# Patient Record
Sex: FEMALE | Race: WHITE | URBAN - METROPOLITAN AREA
[De-identification: names, ages, dates, MRNs, and addresses within clinical notes are randomized per-mention and may not be internally consistent; named-entity substitution may affect disease eponyms.]

---

## 2023-02-21 ENCOUNTER — EMERGENCY (EMERGENCY)
Facility: HOSPITAL | Age: 83
LOS: 0 days | Discharge: HOME | End: 2023-02-21
Attending: STUDENT IN AN ORGANIZED HEALTH CARE EDUCATION/TRAINING PROGRAM
Payer: MEDICARE

## 2023-02-21 VITALS
DIASTOLIC BLOOD PRESSURE: 74 MMHG | HEART RATE: 94 BPM | OXYGEN SATURATION: 92 % | TEMPERATURE: 98 F | SYSTOLIC BLOOD PRESSURE: 160 MMHG | RESPIRATION RATE: 18 BRPM

## 2023-02-21 VITALS — WEIGHT: 104.94 LBS

## 2023-02-21 DIAGNOSIS — H26.9 UNSPECIFIED CATARACT: ICD-10-CM

## 2023-02-21 DIAGNOSIS — S52.612A DISPLACED FRACTURE OF LEFT ULNA STYLOID PROCESS, INITIAL ENCOUNTER FOR CLOSED FRACTURE: ICD-10-CM

## 2023-02-21 DIAGNOSIS — S52.502A UNSPECIFIED FRACTURE OF THE LOWER END OF LEFT RADIUS, INITIAL ENCOUNTER FOR CLOSED FRACTURE: ICD-10-CM
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
## 2023-02-21 DIAGNOSIS — M25.532 PAIN IN LEFT WRIST: ICD-10-CM

## 2023-02-21 DIAGNOSIS — S00.31XA ABRASION OF NOSE, INITIAL ENCOUNTER: ICD-10-CM
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
## 2023-02-21 PROCEDURE — 76376 3D RENDER W/INTRP POSTPROCES: CPT | Mod: 26

## 2023-02-21 PROCEDURE — 73100 X-RAY EXAM OF WRIST: CPT | Mod: 26,59,LT,76

## 2023-02-21 PROCEDURE — 25605 CLTX DST RDL FX/EPHYS SEP W/: CPT | Mod: LT

## 2023-02-21 PROCEDURE — 70450 CT HEAD/BRAIN W/O DYE: CPT | Mod: MA

## 2023-02-21 PROCEDURE — 73130 X-RAY EXAM OF HAND: CPT | Mod: LT

## 2023-02-21 PROCEDURE — 99285 EMERGENCY DEPT VISIT HI MDM: CPT | Mod: 25

## 2023-02-21 PROCEDURE — 70450 CT HEAD/BRAIN W/O DYE: CPT | Mod: 26,MA

## 2023-02-21 PROCEDURE — 73090 X-RAY EXAM OF FOREARM: CPT | Mod: LT

## 2023-02-21 PROCEDURE — 72125 CT NECK SPINE W/O DYE: CPT | Mod: MA

## 2023-02-21 PROCEDURE — 73130 X-RAY EXAM OF HAND: CPT | Mod: 26,LT

## 2023-02-21 PROCEDURE — 72125 CT NECK SPINE W/O DYE: CPT | Mod: 26,MA

## 2023-02-21 PROCEDURE — 70486 CT MAXILLOFACIAL W/O DYE: CPT | Mod: MA

## 2023-02-21 PROCEDURE — 73090 X-RAY EXAM OF FOREARM: CPT | Mod: 26,LT

## 2023-02-21 PROCEDURE — 76376 3D RENDER W/INTRP POSTPROCES: CPT

## 2023-02-21 PROCEDURE — 73110 X-RAY EXAM OF WRIST: CPT | Mod: 26,LT

## 2023-02-21 PROCEDURE — 70486 CT MAXILLOFACIAL W/O DYE: CPT | Mod: 26,MA

## 2023-02-21 PROCEDURE — 99285 EMERGENCY DEPT VISIT HI MDM: CPT | Mod: FS

## 2023-02-21 PROCEDURE — 73110 X-RAY EXAM OF WRIST: CPT | Mod: LT

## 2023-02-21 PROCEDURE — 73100 X-RAY EXAM OF WRIST: CPT | Mod: LT

## 2023-02-21 RX ORDER — ACETAMINOPHEN 500 MG
975 TABLET ORAL ONCE
Refills: 0 | Status: COMPLETED | OUTPATIENT
Start: 2023-02-21 | End: 2023-02-21

## 2023-02-21 RX ADMIN — Medication 975 MILLIGRAM(S): at 12:36

## 2023-02-21 NOTE — ED STATDOCS - PATIENT PORTAL LINK FT
You can access the FollowMyHealth Patient Portal offered by [facility] by registering at the following website: [email or web address]. By joining [employer]’s FollowMyHealth portal, you will also be able to view your health information using other applications (apps) compatible with our system.

## 2023-02-21 NOTE — ED STATDOCS - PROGRESS NOTE DETAILS
Dr. Laws to see patient in ED. - Driss Julio PA-C 81 y/o F with PMH of cataracts presents with mechanical fall last night. St tripped on the stairs, colliding in to the bannister. Denies LOC, AC use. states she injured her nose and left wrist at time of fall. PE: Well appearing. cardiac: s1s2, RRR. lungs; CTAB. ABdomen: NBS x4, soft, nontender. MSK: No obvious deformity to upper extremities. +TTP over left distal radius. No snuffbox tenderness. Sensation intact to light touch in UE digits. Full ROm UE digits. Cap refill < 2 sec in UE digits. HEENT: +abrasion over nasal bridge. TTP over nasal bridge. PERRLA, EOMI. No raccoon sign, no mcdermott sign. A/P: r/o fracture. plan for XR, CT, reassess. - Driss Julio PA-C CTs negative. Fracture reduced by orthopedics, will dc home. - Driss Julio PA-C

## 2023-02-21 NOTE — ED STATDOCS - OBJECTIVE STATEMENT
83 y/o female with a PMHx of cataracts presents to the ED BIB daughter s/p fall last night. Pt was wearing shoes while going down the stairs and fell against the banister and hit her nose. Pt did not fall down the stairs. No LOC. Not on anticoagulation. Pt c/o left wrist pain. Denies n/v, neck pain. Pt took Motrin at home with mild improvement. NKDA. No other complaints at this time. 83 y/o female with a PMHx of cataracts presents to the ED BIB daughter s/p fall last night. Pt was wearing clunky shoes while going down the stairs and fell against the banister and hit her nose. Pt did not fall down the stairs. No LOC. Not on anticoagulation. Pt c/o left wrist pain. Denies n/v, neck pain. Pt took Motrin at home with mild improvement. NKDA. No other complaints at this time.

## 2023-02-21 NOTE — ED STATDOCS - ATTENDING APP SHARED VISIT CONTRIBUTION OF CARE
I, Angel Shirley MD,  performed the initial face to face bedside interview with this patient regarding history of present illness, review of symptoms and relevant past medical, social and family history.  I completed an independent physical examination.  I was the initial provider who evaluated this patient.   I personally saw the patient and performed a substantive portion of the visit including all aspects of the medical decision making.  I have signed out the follow up of any pending tests (i.e. labs, radiological studies) to the [facility].  I have communicated the patient’s plan of care and disposition with the [facility].  The history, relevant review of systems, past medical and surgical history, medical decision making, and physical examination was documented by the scribe in my presence and I attest to the accuracy of the documentation.

## 2023-02-21 NOTE — ED STATDOCS - CARE PLAN
1 Principal Discharge DX:	Distal radius fracture, left  Secondary Diagnosis:	Displaced fracture of styloid process of left ulna

## 2023-02-21 NOTE — ED STATDOCS - CARE PROVIDER_API CALL
Salvador Laws)  Orthopaedic Surgery; Surgery of the Hand  [address] [place] [address]  Phone: [phone]  Fax: [phone]  Follow Up Time:

## 2023-02-21 NOTE — ED STATDOCS - CHPI ED RELIEVING FACTORS
nothing
PAST SURGICAL HISTORY:  History of breast lump/mass excision     Status post right foot surgery

## 2023-02-21 NOTE — ED STATDOCS - PHYSICAL EXAMINATION
Constitutional: Awake, Alert, non-toxic. No acute distress. Well appearing, well nourished.   HEAD: Normocephalic, atraumatic.   EYES: PERRL, EOM intact, conjunctiva and sclera are clear bilaterally.  ENT: External ears normal. No rhinorrhea, no tracheal deviation   NECK: Supple, non-tender  CARDIOVASCULAR: regular rate and rhythm.  RESPIRATORY: Normal respiratory effort; breath sounds CTAB, no wheezes, rhonchi, or rales. Speaking in full sentences. No accessory muscle use.   ABDOMEN: Soft; non-tender, non-distended. No rebound or guarding.   EXTREMITIES:  no lower extremity edema, no deformities  SKIN: Warm, dry  NEURO: A&O x3. Sensory and motor functions are grossly intact. Speech is normal. No facial droop.  PSYCH: Appearance and judgement seem appropriate for gender and age. Constitutional: Awake, Alert, non-toxic. No acute distress. Well appearing, well nourished.   HEAD: Normocephalic. TTP over nasal bridge. No nasoseptal hematoma. No other trauma seen to head or neck.   EYES: PERRL, EOM intact, conjunctiva and sclera are clear bilaterally.  ENT: External ears normal. No rhinorrhea, no tracheal deviation   NECK: Supple, non-tender  CARDIOVASCULAR: regular rate and rhythm.  RESPIRATORY: Normal respiratory effort; breath sounds CTAB, no wheezes, rhonchi, or rales. Speaking in full sentences. No accessory muscle use.   ABDOMEN: Soft; non-tender, non-distended. No rebound or guarding.   EXTREMITIES: Left distal wrist TTP with swelling. No obvious deformity. Intact radial pulse. Able to wiggles fingers. No scaphoid TTP.  SKIN: Warm, dry  NEURO: A&O x3. Sensory and motor functions are grossly intact. Speech is normal. No facial droop.  PSYCH: Appearance and judgement seem appropriate for gender and age.

## 2023-02-21 NOTE — ED ADULT TRIAGE NOTE - CHIEF COMPLAINT QUOTE
Pt ambulatory to triage, c/o mechanical fall at 11pm last night. +head strike. Denies LOC or anticoagulant use. Endorses nose pain and L wrist pain. Noted deformity to the L wrist. No other complaints.

## 2023-02-21 NOTE — ED STATDOCS - CLINICAL SUMMARY MEDICAL DECISION MAKING FREE TEXT BOX
Pt with nonsyncopal fall yesterday. Concern for left wrist fracture. No proximal forearm pain to suggest elbow pathology. Given age, will also CT head and Cspine though reassured with normal exam. Possible nasal bone fracture. Pt without syncope. Do not feel pt needs additional workup from fall perspective.

## 2024-06-07 ENCOUNTER — EMERGENCY (EMERGENCY)
Facility: HOSPITAL | Age: 84
LOS: 0 days | Discharge: HOME | End: 2024-06-08
Attending: STUDENT IN AN ORGANIZED HEALTH CARE EDUCATION/TRAINING PROGRAM
Payer: MEDICARE

## 2024-06-07 VITALS
SYSTOLIC BLOOD PRESSURE: 149 MMHG | WEIGHT: 114.86 LBS | DIASTOLIC BLOOD PRESSURE: 96 MMHG | HEART RATE: 83 BPM | OXYGEN SATURATION: 100 % | RESPIRATION RATE: 18 BRPM | TEMPERATURE: 98 F

## 2024-06-07 DIAGNOSIS — S81.801A UNSPECIFIED OPEN WOUND, RIGHT LOWER LEG, INITIAL ENCOUNTER: ICD-10-CM
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
## 2024-06-07 DIAGNOSIS — I10 ESSENTIAL (PRIMARY) HYPERTENSION: ICD-10-CM
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
## 2024-06-07 LAB
ALBUMIN SERPL ELPH-MCNC: 3.3 G/DL — SIGNIFICANT CHANGE UP (ref 3.3–5)
ALP SERPL-CCNC: 115 U/L — SIGNIFICANT CHANGE UP (ref 40–120)
ALT FLD-CCNC: 35 U/L — SIGNIFICANT CHANGE UP (ref 12–78)
ANION GAP SERPL CALC-SCNC: 4 MMOL/L — LOW (ref 5–17)
APTT BLD: 31.1 SEC — SIGNIFICANT CHANGE UP (ref 24.5–35.6)
AST SERPL-CCNC: 35 U/L — SIGNIFICANT CHANGE UP (ref 15–37)
BASOPHILS # BLD AUTO: 0.04 K/UL — SIGNIFICANT CHANGE UP (ref 0–0.2)
BASOPHILS NFR BLD AUTO: 0.6 % — SIGNIFICANT CHANGE UP (ref 0–2)
BILIRUB SERPL-MCNC: 0.5 MG/DL — SIGNIFICANT CHANGE UP (ref 0.2–1.2)
BLD GP AB SCN SERPL QL: SIGNIFICANT CHANGE UP
BUN SERPL-MCNC: 10 MG/DL — SIGNIFICANT CHANGE UP (ref 7–23)
CALCIUM SERPL-MCNC: 8.2 MG/DL — LOW (ref 8.5–10.1)
CHLORIDE SERPL-SCNC: 100 MMOL/L — SIGNIFICANT CHANGE UP (ref 96–108)
CO2 SERPL-SCNC: 33 MMOL/L — HIGH (ref 22–31)
CREAT SERPL-MCNC: 0.59 MG/DL — SIGNIFICANT CHANGE UP (ref 0.5–1.3)
EGFR: 89 ML/MIN/1.73M2 — SIGNIFICANT CHANGE UP
EOSINOPHIL # BLD AUTO: 0.11 K/UL — SIGNIFICANT CHANGE UP (ref 0–0.5)
EOSINOPHIL NFR BLD AUTO: 1.6 % — SIGNIFICANT CHANGE UP (ref 0–6)
ERYTHROCYTE [SEDIMENTATION RATE] IN BLOOD: 7 MM/HR — SIGNIFICANT CHANGE UP (ref 0–20)
GLUCOSE SERPL-MCNC: 86 MG/DL — SIGNIFICANT CHANGE UP (ref 70–99)
HCT VFR BLD CALC: 36.8 % — SIGNIFICANT CHANGE UP (ref 34.5–45)
HGB BLD-MCNC: 12.1 G/DL — SIGNIFICANT CHANGE UP (ref 11.5–15.5)
IMM GRANULOCYTES NFR BLD AUTO: 0.1 % — SIGNIFICANT CHANGE UP (ref 0–0.9)
INR BLD: 1.01 RATIO — SIGNIFICANT CHANGE UP (ref 0.85–1.18)
LYMPHOCYTES # BLD AUTO: 1.7 K/UL — SIGNIFICANT CHANGE UP (ref 1–3.3)
LYMPHOCYTES # BLD AUTO: 24.3 % — SIGNIFICANT CHANGE UP (ref 13–44)
MCHC RBC-ENTMCNC: 31.6 PG — SIGNIFICANT CHANGE UP (ref 27–34)
MCHC RBC-ENTMCNC: 32.9 GM/DL — SIGNIFICANT CHANGE UP (ref 32–36)
MCV RBC AUTO: 96.1 FL — SIGNIFICANT CHANGE UP (ref 80–100)
MONOCYTES # BLD AUTO: 0.49 K/UL — SIGNIFICANT CHANGE UP (ref 0–0.9)
MONOCYTES NFR BLD AUTO: 7 % — SIGNIFICANT CHANGE UP (ref 2–14)
NEUTROPHILS # BLD AUTO: 4.65 K/UL — SIGNIFICANT CHANGE UP (ref 1.8–7.4)
NEUTROPHILS NFR BLD AUTO: 66.4 % — SIGNIFICANT CHANGE UP (ref 43–77)
PLATELET # BLD AUTO: 368 K/UL — SIGNIFICANT CHANGE UP (ref 150–400)
POTASSIUM SERPL-MCNC: 4.3 MMOL/L — SIGNIFICANT CHANGE UP (ref 3.5–5.3)
POTASSIUM SERPL-SCNC: 4.3 MMOL/L — SIGNIFICANT CHANGE UP (ref 3.5–5.3)
PROT SERPL-MCNC: 6.4 GM/DL — SIGNIFICANT CHANGE UP (ref 6–8.3)
PROTHROM AB SERPL-ACNC: 11.4 SEC — SIGNIFICANT CHANGE UP (ref 9.5–13)
RBC # BLD: 3.83 M/UL — SIGNIFICANT CHANGE UP (ref 3.8–5.2)
RBC # FLD: 14.1 % — SIGNIFICANT CHANGE UP (ref 10.3–14.5)
SODIUM SERPL-SCNC: 137 MMOL/L — SIGNIFICANT CHANGE UP (ref 135–145)
WBC # BLD: 7 K/UL — SIGNIFICANT CHANGE UP (ref 3.8–10.5)
WBC # FLD AUTO: 7 K/UL — SIGNIFICANT CHANGE UP (ref 3.8–10.5)

## 2024-06-07 PROCEDURE — 73701 CT LOWER EXTREMITY W/DYE: CPT | Mod: MC,RT

## 2024-06-07 PROCEDURE — 85652 RBC SED RATE AUTOMATED: CPT

## 2024-06-07 PROCEDURE — 73590 X-RAY EXAM OF LOWER LEG: CPT | Mod: 26,RT

## 2024-06-07 PROCEDURE — 80053 COMPREHEN METABOLIC PANEL: CPT

## 2024-06-07 PROCEDURE — 93970 EXTREMITY STUDY: CPT

## 2024-06-07 PROCEDURE — 87040 BLOOD CULTURE FOR BACTERIA: CPT | Mod: 91

## 2024-06-07 PROCEDURE — 85610 PROTHROMBIN TIME: CPT

## 2024-06-07 PROCEDURE — 36415 COLL VENOUS BLD VENIPUNCTURE: CPT

## 2024-06-07 PROCEDURE — 86900 BLOOD TYPING SEROLOGIC ABO: CPT

## 2024-06-07 PROCEDURE — 86140 C-REACTIVE PROTEIN: CPT

## 2024-06-07 PROCEDURE — 99285 EMERGENCY DEPT VISIT HI MDM: CPT | Mod: 25

## 2024-06-07 PROCEDURE — 85730 THROMBOPLASTIN TIME PARTIAL: CPT

## 2024-06-07 PROCEDURE — 99285 EMERGENCY DEPT VISIT HI MDM: CPT

## 2024-06-07 PROCEDURE — 73701 CT LOWER EXTREMITY W/DYE: CPT | Mod: 26,RT,MC

## 2024-06-07 PROCEDURE — 73590 X-RAY EXAM OF LOWER LEG: CPT | Mod: RT

## 2024-06-07 PROCEDURE — 85025 COMPLETE CBC W/AUTO DIFF WBC: CPT

## 2024-06-07 PROCEDURE — 86850 RBC ANTIBODY SCREEN: CPT

## 2024-06-07 PROCEDURE — 86901 BLOOD TYPING SEROLOGIC RH(D): CPT

## 2024-06-07 RX ORDER — VANCOMYCIN HCL 1 G
750 VIAL (EA) INTRAVENOUS ONCE
Refills: 0 | Status: DISCONTINUED | OUTPATIENT
Start: 2024-06-07 | End: 2024-06-08

## 2024-06-07 RX ORDER — PIPERACILLIN AND TAZOBACTAM 4; .5 G/20ML; G/20ML
3.38 INJECTION, POWDER, LYOPHILIZED, FOR SOLUTION INTRAVENOUS ONCE
Refills: 0 | Status: DISCONTINUED | OUTPATIENT
Start: 2024-06-07 | End: 2024-06-08

## 2024-06-07 RX ORDER — VANCOMYCIN HCL 1 G
1000 VIAL (EA) INTRAVENOUS ONCE
Refills: 0 | Status: DISCONTINUED | OUTPATIENT
Start: 2024-06-07 | End: 2024-06-07

## 2024-06-07 NOTE — ED STATDOCS - NSFOLLOWUPINSTRUCTIONS_ED_ALL_ED_FT
** You were seen by the surgery service and cleared for discharge. Change your dressing the way you were taught.     ** Follow up with your primary care doctor in the next 72 hours. Follow up at wound clinic on Monday.     ** Go to the nearest Emergency Department if you experience any new or concerning symptoms, such as:   - worsening pain in your wound  - chest pain  - difficulty breathing  - passing out  - unable to eat or drink  - unable to move or feel part of your body  - fever, chills

## 2024-06-07 NOTE — ED STATDOCS - OBJECTIVE STATEMENT
85 y/o female with PMHx of female presents to the ED c/o non painful wound to right lower leg. Pt states over past several weeks she has had wound to right lower leg, worse over past week, went to urgent care today and advised to come to the ED. Pt denies pain or swelling to leg. Pt notes several weeks ago was having lower extremity swelling, cardiac issues and was placed on eliquis, pt states swelling improved at this time. Pt denies fevers, chills, 83 y/o female presents to the ED c/o non painful wound to right lower leg. Pt states over past several weeks she has had wound to right lower leg, worse over past week, went to urgent care today and advised to come to the ED. Pt denies pain or swelling to leg. Pt notes several weeks ago was having lower extremity swelling, cardiac issues and was placed on eliquis, pt states swelling improved at this time. Pt denies fevers, chills,

## 2024-06-07 NOTE — ED STATDOCS - PATIENT PORTAL LINK FT
You can access the FollowMyHealth Patient Portal offered by [facility] by registering at the following website: [email or web address]. By joining [facility] [employer] [facility]’s FollowMyHealth portal, you will also be able to view your health information using other applications (apps) compatible with our system.

## 2024-06-07 NOTE — ED STATDOCS - PHYSICAL EXAMINATION
Jose DUMAS:  Gen: Well appearing in NAD   Head: NC/AT  Neck: trachea midline  Resp:  No distress  Ext: no deformities  Neuro:  A&O appears non focal  Skin:  right lateral lower leg with [id number] chronic open wound with   Psych:  Normal affect and mood Jose MD:  Gen: Well appearing in NAD   Head: NC/AT  Neck: trachea midline  Resp:  No distress  Ext: no deformities  Neuro:  A&O appears non focal  Skin:  right lateral lower leg with [id number] chronic open wound, non tender, no surrounding erythema, no bleeding.   Psych:  Normal affect and mood

## 2024-06-07 NOTE — ED STATDOCS - PROGRESS NOTE DETAILS
Sandhya Garcia MD, Attending  surgery consulted will see pt Sandhya Garcia MD, Attending  notified by RN that abx from supertrack not administered.   At this time, surgery is not recommending oupt abx due to low suspicion for infection, so will discontinue ordered abx. Pt awaiting duplex to r/o dvt per vascular recommendation.

## 2024-06-07 NOTE — ED ADULT TRIAGE NOTE - CHIEF COMPLAINT QUOTE
Pt presents to the ED c/o wound check. Pt reports having a wound on the right lower leg that is oozing blood and pus, went to the UC today and was told that she needed IV antibiotics. Pt was started on eliquis 1month ago. Pt denies fevers or chills.

## 2024-06-07 NOTE — ED STATDOCS - CLINICAL SUMMARY MEDICAL DECISION MAKING FREE TEXT BOX
ddx includes, but is not limited to the following: necrotic wound, osteomyelitis, necrotising  fasciitis, gangrene     plan: screen labs, XR, CT, IV antibiotics

## 2024-06-08 VITALS
DIASTOLIC BLOOD PRESSURE: 96 MMHG | HEART RATE: 100 BPM | SYSTOLIC BLOOD PRESSURE: 162 MMHG | OXYGEN SATURATION: 98 % | TEMPERATURE: 98 F | RESPIRATION RATE: 17 BRPM

## 2024-06-08 PROBLEM — H26.9 UNSPECIFIED CATARACT: Chronic | Status: ACTIVE | Noted: 2023-02-22

## 2024-06-08 LAB
ABO RH CONFIRMATION: SIGNIFICANT CHANGE UP
CRP SERPL-MCNC: <3 MG/L — SIGNIFICANT CHANGE UP

## 2024-06-08 PROCEDURE — 99284 EMERGENCY DEPT VISIT MOD MDM: CPT | Mod: GC

## 2024-06-08 PROCEDURE — 93970 EXTREMITY STUDY: CPT | Mod: 26

## 2024-06-08 NOTE — ED ADULT NURSE NOTE - NSFALLHARMRISKINTERV_ED_ALL_ED

## 2024-06-08 NOTE — ED ADULT NURSE NOTE - AS PAIN REST
0 (no pain/absence of nonverbal indicators of pain) O-L Flap Text: The defect edges were debeveled with a #15 scalpel blade.  Given the location of the defect, shape of the defect and the proximity to free margins an O-L flap was deemed most appropriate.  Using a sterile surgical marker, an appropriate advancement flap was drawn incorporating the defect and placing the expected incisions within the relaxed skin tension lines where possible.    The area thus outlined was incised deep to adipose tissue with a #15 scalpel blade.  The skin margins were undermined to an appropriate distance in all directions utilizing iris scissors.

## 2024-06-08 NOTE — ED ADULT NURSE NOTE - OBJECTIVE STATEMENT
Assumed care of pt at 0230    84y female AAox4 ambulatory from home presents to ED c/o wound check. Pt presents with wound to the right lower extremity that has gotten worse in the past week. No fevers, no pain. Pt recently started on Eliquis- had b/l lower extremity swelling. Pt reports seeing an improvement in swelling. no s/s CP or respiratory distress. Daughter at the bedside. Respirations are even and unlabored, in NAD.

## 2024-06-08 NOTE — CONSULT NOTE ADULT - SUBJECTIVE AND OBJECTIVE BOX
HPI:  85 y/o female w/ HTN, who presents to the ED c/o non painful wound to right lateral leg. Pt states over past three weeks she has had wound to right lateral leg, that started to weep more over the past week.  Went to urgent care today, where advised to come to the ED. Pt denies known trauma to leg. Underwent outpatient arterial & venous duplex of b/l LE, reportedly without DVT or significant narrowing. Pt notes several weeks ago was having lower extremity swelling, cardiac issues and was placed on Eliquis for not even a week, before they decided to stop it since it's thought the Eliquis worsened the wound, so no longer on Eliquis for > 1 week. Pt states swelling improved a bit in the meantime. Pt denies fevers, chills, nausea, paresthesias, lightheadedness, dizziness.        ROS: 14 systems reviewed with pertinent positives and negatives as above.    PAST MEDICAL & SURGICAL HISTORY:  Cataract          MEDICATIONS  (STANDING):  piperacillin/tazobactam IVPB... 3.375 Gram(s) IV Intermittent once  vancomycin  IVPB 750 milliGRAM(s) IV Intermittent Once    MEDICATIONS  (PRN):      Allergies    No Known Allergies    Intolerances        SOCIAL HISTORY: Former smoker (quit 20+ y ago), denies EtOH, denies illicits    FAMILY HISTORY: Denies fam h/o PAD          Physical Exam:  GENERAL: NAD, well developed  HEAD: Atraumatic, normocephalic  EYES: EOMI, PERRLA, conjunctiva and sclera clear  ENT: moist mucous membrane  NECK: supple, No JVD, midline trachea  CHEST/LUNG: No increased WOB, symmetric excursions  Heart: RRR ppp, no peripheral edema  ABDOMEN: round, soft, nondistended, nontender. no organomegaly  EXTREMITIES: +2 femoral & radial pulses bilaterally, doppler signals of biphasic left DP, monophasic right DP & monophasic PT b/l. brisk cap refill. no clubbing or cyanosis. 2+ pitting edema b/l.  NERVOUS SYSTEM: AOx4, speech clear, no neuro-deficits  MSK: full ROM, no deformities  SKIN: warm to touch, no rash, right proximal lateral leg wound with subcutaneous tissue stained with old blood that does not irrigate away, wound 4 cm x 2 cm, WTD w/ saline placed, no surrounding erythema        Vital Signs Last 24 Hrs  T(C): 36.6 (07 Jun 2024 21:27), Max: 36.6 (07 Jun 2024 21:27)  T(F): 97.9 (07 Jun 2024 21:27), Max: 97.9 (07 Jun 2024 21:27)  HR: 83 (07 Jun 2024 21:27) (83 - 83)  BP: 149/96 (07 Jun 2024 21:27) (149/96 - 149/96)  BP(mean): --  RR: 18 (07 Jun 2024 21:27) (18 - 18)  SpO2: 100% (07 Jun 2024 21:27) (100% - 100%)    Parameters below as of 07 Jun 2024 21:27  Patient On (Oxygen Delivery Method): room air        I&O's Summary          LABS:                        12.1   7.00  )-----------( 368      ( 07 Jun 2024 22:12 )             36.8     06-07    137  |  100  |  10  ----------------------------<  86  4.3   |  33<H>  |  0.59    Ca    8.2<L>      07 Jun 2024 22:12    TPro  6.4  /  Alb  3.3  /  TBili  0.5  /  DBili  x   /  AST  35  /  ALT  35  /  AlkPhos  115  06-07    PT/INR - ( 07 Jun 2024 22:12 )   PT: 11.4 sec;   INR: 1.01 ratio         PTT - ( 07 Jun 2024 22:12 )  PTT:31.1 sec  Urinalysis Basic - ( 07 Jun 2024 22:12 )    Color: x / Appearance: x / SG: x / pH: x  Gluc: 86 mg/dL / Ketone: x  / Bili: x / Urobili: x   Blood: x / Protein: x / Nitrite: x   Leuk Esterase: x / RBC: x / WBC x   Sq Epi: x / Non Sq Epi: x / Bacteria: x        LIVER FUNCTIONS - ( 07 Jun 2024 22:12 )  Alb: 3.3 g/dL / Pro: 6.4 gm/dL / ALK PHOS: 115 U/L / ALT: 35 U/L / AST: 35 U/L / GGT: x             RADIOLOGY & ADDITIONAL STUDIES:  6/7/24 CT leg w/ IV con  IMPRESSION:    No obvious bone erosion or periosteal reaction. Diffuse stranding at the   right and visualized left lower extremity soft tissue stable, which may   be due to edema and/or cellulitis. A 8.0 x 2.0 x 9.5 cm heterogeneous   structure underlying the right mid posterior leg wound as described,   which may represent a hematoma although superimposed infection/abscess or   underlying lesion/neoplasm is not excluded. Recommend clinical   correlation and follow-up.    Hazy hypodensities at the visualized right distal femoral vein, popliteal   vein and calf veins, presumably related to incomplete opacification. If   there is clinical suspicion for DVT, follow-up venous Doppler ultrasound   may be obtained for further evaluation.

## 2024-06-08 NOTE — CONSULT NOTE ADULT - ATTENDING COMMENTS
Patient with likely anticoagulation related trauma to the R calf with ulceration and hematoma. Normal CTA with runoff. DVT study negative. WTD dressings. Follow up in wound care on monday 6/11/24

## 2024-06-08 NOTE — CONSULT NOTE ADULT - ASSESSMENT
83 y/o female w/ HTN, whose non painful wound to right lateral leg seems most consistent w/ hematoma in background of possible venous insufficiency and PAD (that is asymptomatic, but patient is without distal pulses, motor & sensory intact).     Recommendations  Obtain b/l LE venous duplex  Irrigate wound  Local wound care w/ WTD (NS) taught to patient & daughter  F/u in Wound Care Clinic Monday 6/10/24    D/w Dr. Gutierrez

## 2024-06-08 NOTE — ED ADULT NURSE NOTE - BIRTH SEX
Female Plan: Patient given lab slip and will have labs drawn at outside lab in 2 weeks Detail Level: Zone

## 2024-06-10 ENCOUNTER — OUTPATIENT (OUTPATIENT)
Dept: OUTPATIENT SERVICES | Facility: HOSPITAL | Age: 84
LOS: 1 days | End: 2024-06-10
Payer: MEDICARE

## 2024-06-10 DIAGNOSIS — L97.815 NON-PRESSURE CHRONIC ULCER OF OTHER PART OF RIGHT LOWER LEG WITH MUSCLE INVOLVEMENT WITHOUT EVIDENCE OF NECROSIS: ICD-10-CM

## 2024-06-10 DIAGNOSIS — J44.9 CHRONIC OBSTRUCTIVE PULMONARY DISEASE, UNSPECIFIED: ICD-10-CM

## 2024-06-10 DIAGNOSIS — I48.91 UNSPECIFIED ATRIAL FIBRILLATION: ICD-10-CM

## 2024-06-10 DIAGNOSIS — I89.0 LYMPHEDEMA, NOT ELSEWHERE CLASSIFIED: ICD-10-CM

## 2024-06-10 DIAGNOSIS — I50.32 CHRONIC DIASTOLIC (CONGESTIVE) HEART FAILURE: ICD-10-CM

## 2024-06-10 DIAGNOSIS — M19.90 UNSPECIFIED OSTEOARTHRITIS, UNSPECIFIED SITE: ICD-10-CM

## 2024-06-10 DIAGNOSIS — M79.661 PAIN IN RIGHT LOWER LEG: ICD-10-CM

## 2024-06-10 DIAGNOSIS — I11.0 HYPERTENSIVE HEART DISEASE WITH HEART FAILURE: ICD-10-CM

## 2024-06-10 PROCEDURE — 99203 OFFICE O/P NEW LOW 30 MIN: CPT

## 2024-06-11 ENCOUNTER — APPOINTMENT (OUTPATIENT)
Dept: WOUND CARE | Facility: HOSPITAL | Age: 84
End: 2024-06-11

## 2024-06-13 ENCOUNTER — OUTPATIENT (OUTPATIENT)
Dept: OUTPATIENT SERVICES | Facility: HOSPITAL | Age: 84
LOS: 1 days | End: 2024-06-13

## 2024-06-13 DIAGNOSIS — M79.661 PAIN IN RIGHT LOWER LEG: ICD-10-CM

## 2024-06-13 LAB
CULTURE RESULTS: SIGNIFICANT CHANGE UP
CULTURE RESULTS: SIGNIFICANT CHANGE UP
SPECIMEN SOURCE: SIGNIFICANT CHANGE UP
SPECIMEN SOURCE: SIGNIFICANT CHANGE UP

## 2024-06-17 ENCOUNTER — OUTPATIENT (OUTPATIENT)
Dept: OUTPATIENT SERVICES | Facility: HOSPITAL | Age: 84
LOS: 1 days | End: 2024-06-17
Payer: MEDICARE

## 2024-06-17 DIAGNOSIS — M79.661 PAIN IN RIGHT LOWER LEG: ICD-10-CM

## 2024-06-17 PROCEDURE — 99212 OFFICE O/P EST SF 10 MIN: CPT

## 2024-06-25 DIAGNOSIS — I48.91 UNSPECIFIED ATRIAL FIBRILLATION: ICD-10-CM

## 2024-06-25 DIAGNOSIS — I50.32 CHRONIC DIASTOLIC (CONGESTIVE) HEART FAILURE: ICD-10-CM

## 2024-06-25 DIAGNOSIS — I11.0 HYPERTENSIVE HEART DISEASE WITH HEART FAILURE: ICD-10-CM

## 2024-06-25 DIAGNOSIS — J44.9 CHRONIC OBSTRUCTIVE PULMONARY DISEASE, UNSPECIFIED: ICD-10-CM

## 2024-06-25 DIAGNOSIS — L97.815 NON-PRESSURE CHRONIC ULCER OF OTHER PART OF RIGHT LOWER LEG WITH MUSCLE INVOLVEMENT WITHOUT EVIDENCE OF NECROSIS: ICD-10-CM

## 2024-06-25 DIAGNOSIS — M19.90 UNSPECIFIED OSTEOARTHRITIS, UNSPECIFIED SITE: ICD-10-CM

## 2024-06-25 DIAGNOSIS — I89.0 LYMPHEDEMA, NOT ELSEWHERE CLASSIFIED: ICD-10-CM

## 2024-06-27 ENCOUNTER — OUTPATIENT (OUTPATIENT)
Dept: OUTPATIENT SERVICES | Facility: HOSPITAL | Age: 84
LOS: 1 days | End: 2024-06-27
Payer: MEDICARE

## 2024-06-27 DIAGNOSIS — L97.201 NON-PRESSURE CHRONIC ULCER OF UNSPECIFIED CALF LIMITED TO BREAKDOWN OF SKIN: ICD-10-CM

## 2024-06-27 PROCEDURE — 11042 DBRDMT SUBQ TIS 1ST 20SQCM/<: CPT

## 2024-07-02 ENCOUNTER — OUTPATIENT (OUTPATIENT)
Dept: OUTPATIENT SERVICES | Facility: HOSPITAL | Age: 84
LOS: 1 days | End: 2024-07-02
Payer: MEDICARE

## 2024-07-02 DIAGNOSIS — L97.815 NON-PRESSURE CHRONIC ULCER OF OTHER PART OF RIGHT LOWER LEG WITH MUSCLE INVOLVEMENT WITHOUT EVIDENCE OF NECROSIS: ICD-10-CM

## 2024-07-02 DIAGNOSIS — M19.90 UNSPECIFIED OSTEOARTHRITIS, UNSPECIFIED SITE: ICD-10-CM

## 2024-07-02 DIAGNOSIS — I11.0 HYPERTENSIVE HEART DISEASE WITH HEART FAILURE: ICD-10-CM

## 2024-07-02 DIAGNOSIS — I48.91 UNSPECIFIED ATRIAL FIBRILLATION: ICD-10-CM

## 2024-07-02 DIAGNOSIS — J44.9 CHRONIC OBSTRUCTIVE PULMONARY DISEASE, UNSPECIFIED: ICD-10-CM

## 2024-07-02 DIAGNOSIS — L97.201 NON-PRESSURE CHRONIC ULCER OF UNSPECIFIED CALF LIMITED TO BREAKDOWN OF SKIN: ICD-10-CM

## 2024-07-02 DIAGNOSIS — I50.32 CHRONIC DIASTOLIC (CONGESTIVE) HEART FAILURE: ICD-10-CM

## 2024-07-02 DIAGNOSIS — I89.0 LYMPHEDEMA, NOT ELSEWHERE CLASSIFIED: ICD-10-CM

## 2024-07-02 PROCEDURE — 11043 DBRDMT MUSC&/FSCA 1ST 20/<: CPT

## 2024-07-05 ENCOUNTER — OUTPATIENT (OUTPATIENT)
Dept: OUTPATIENT SERVICES | Facility: HOSPITAL | Age: 84
LOS: 1 days | End: 2024-07-05
Payer: MEDICARE

## 2024-07-05 DIAGNOSIS — L97.201 NON-PRESSURE CHRONIC ULCER OF UNSPECIFIED CALF LIMITED TO BREAKDOWN OF SKIN: ICD-10-CM

## 2024-07-05 PROCEDURE — 29581 APPL MULTLAYER CMPRN SYS LEG: CPT

## 2024-07-09 ENCOUNTER — OUTPATIENT (OUTPATIENT)
Dept: OUTPATIENT SERVICES | Facility: HOSPITAL | Age: 84
LOS: 1 days | End: 2024-07-09
Payer: MEDICARE

## 2024-07-09 DIAGNOSIS — L97.201 NON-PRESSURE CHRONIC ULCER OF UNSPECIFIED CALF LIMITED TO BREAKDOWN OF SKIN: ICD-10-CM

## 2024-07-09 PROCEDURE — 11042 DBRDMT SUBQ TIS 1ST 20SQCM/<: CPT

## 2024-07-12 DIAGNOSIS — J44.9 CHRONIC OBSTRUCTIVE PULMONARY DISEASE, UNSPECIFIED: ICD-10-CM

## 2024-07-12 DIAGNOSIS — I89.0 LYMPHEDEMA, NOT ELSEWHERE CLASSIFIED: ICD-10-CM

## 2024-07-12 DIAGNOSIS — M19.90 UNSPECIFIED OSTEOARTHRITIS, UNSPECIFIED SITE: ICD-10-CM

## 2024-07-12 DIAGNOSIS — I48.91 UNSPECIFIED ATRIAL FIBRILLATION: ICD-10-CM

## 2024-07-12 DIAGNOSIS — I11.0 HYPERTENSIVE HEART DISEASE WITH HEART FAILURE: ICD-10-CM

## 2024-07-12 DIAGNOSIS — I50.32 CHRONIC DIASTOLIC (CONGESTIVE) HEART FAILURE: ICD-10-CM

## 2024-07-12 DIAGNOSIS — L97.815 NON-PRESSURE CHRONIC ULCER OF OTHER PART OF RIGHT LOWER LEG WITH MUSCLE INVOLVEMENT WITHOUT EVIDENCE OF NECROSIS: ICD-10-CM

## 2024-07-16 ENCOUNTER — OUTPATIENT (OUTPATIENT)
Dept: OUTPATIENT SERVICES | Facility: HOSPITAL | Age: 84
LOS: 1 days | End: 2024-07-16
Payer: MEDICARE

## 2024-07-16 DIAGNOSIS — I11.0 HYPERTENSIVE HEART DISEASE WITH HEART FAILURE: ICD-10-CM

## 2024-07-16 DIAGNOSIS — I89.0 LYMPHEDEMA, NOT ELSEWHERE CLASSIFIED: ICD-10-CM

## 2024-07-16 DIAGNOSIS — J44.9 CHRONIC OBSTRUCTIVE PULMONARY DISEASE, UNSPECIFIED: ICD-10-CM

## 2024-07-16 DIAGNOSIS — L97.815 NON-PRESSURE CHRONIC ULCER OF OTHER PART OF RIGHT LOWER LEG WITH MUSCLE INVOLVEMENT WITHOUT EVIDENCE OF NECROSIS: ICD-10-CM

## 2024-07-16 DIAGNOSIS — I48.91 UNSPECIFIED ATRIAL FIBRILLATION: ICD-10-CM

## 2024-07-16 DIAGNOSIS — I50.32 CHRONIC DIASTOLIC (CONGESTIVE) HEART FAILURE: ICD-10-CM

## 2024-07-16 DIAGNOSIS — M19.90 UNSPECIFIED OSTEOARTHRITIS, UNSPECIFIED SITE: ICD-10-CM

## 2024-07-16 DIAGNOSIS — L97.201 NON-PRESSURE CHRONIC ULCER OF UNSPECIFIED CALF LIMITED TO BREAKDOWN OF SKIN: ICD-10-CM

## 2024-07-16 PROCEDURE — 11042 DBRDMT SUBQ TIS 1ST 20SQCM/<: CPT

## 2024-07-17 DIAGNOSIS — L97.815 NON-PRESSURE CHRONIC ULCER OF OTHER PART OF RIGHT LOWER LEG WITH MUSCLE INVOLVEMENT WITHOUT EVIDENCE OF NECROSIS: ICD-10-CM

## 2024-07-17 DIAGNOSIS — I11.0 HYPERTENSIVE HEART DISEASE WITH HEART FAILURE: ICD-10-CM

## 2024-07-17 DIAGNOSIS — I89.0 LYMPHEDEMA, NOT ELSEWHERE CLASSIFIED: ICD-10-CM

## 2024-07-17 DIAGNOSIS — M19.90 UNSPECIFIED OSTEOARTHRITIS, UNSPECIFIED SITE: ICD-10-CM

## 2024-07-17 DIAGNOSIS — J44.9 CHRONIC OBSTRUCTIVE PULMONARY DISEASE, UNSPECIFIED: ICD-10-CM

## 2024-07-17 DIAGNOSIS — I48.91 UNSPECIFIED ATRIAL FIBRILLATION: ICD-10-CM

## 2024-07-17 DIAGNOSIS — I50.32 CHRONIC DIASTOLIC (CONGESTIVE) HEART FAILURE: ICD-10-CM

## 2024-07-29 DIAGNOSIS — I89.0 LYMPHEDEMA, NOT ELSEWHERE CLASSIFIED: ICD-10-CM

## 2024-07-29 DIAGNOSIS — L97.815 NON-PRESSURE CHRONIC ULCER OF OTHER PART OF RIGHT LOWER LEG WITH MUSCLE INVOLVEMENT WITHOUT EVIDENCE OF NECROSIS: ICD-10-CM

## 2024-07-29 DIAGNOSIS — I50.32 CHRONIC DIASTOLIC (CONGESTIVE) HEART FAILURE: ICD-10-CM

## 2024-07-29 DIAGNOSIS — I48.91 UNSPECIFIED ATRIAL FIBRILLATION: ICD-10-CM

## 2024-07-29 DIAGNOSIS — M19.90 UNSPECIFIED OSTEOARTHRITIS, UNSPECIFIED SITE: ICD-10-CM

## 2024-07-29 DIAGNOSIS — I11.0 HYPERTENSIVE HEART DISEASE WITH HEART FAILURE: ICD-10-CM

## 2024-07-29 DIAGNOSIS — J44.9 CHRONIC OBSTRUCTIVE PULMONARY DISEASE, UNSPECIFIED: ICD-10-CM

## 2024-07-30 ENCOUNTER — OUTPATIENT (OUTPATIENT)
Dept: OUTPATIENT SERVICES | Facility: HOSPITAL | Age: 84
LOS: 1 days | End: 2024-07-30
Payer: MEDICARE

## 2024-07-30 DIAGNOSIS — L97.201 NON-PRESSURE CHRONIC ULCER OF UNSPECIFIED CALF LIMITED TO BREAKDOWN OF SKIN: ICD-10-CM

## 2024-07-30 PROCEDURE — 29580 STRAPPING UNNA BOOT: CPT | Mod: RT

## 2024-08-06 ENCOUNTER — OUTPATIENT (OUTPATIENT)
Dept: OUTPATIENT SERVICES | Facility: HOSPITAL | Age: 84
LOS: 1 days | End: 2024-08-06
Payer: MEDICARE

## 2024-08-06 DIAGNOSIS — L97.201 NON-PRESSURE CHRONIC ULCER OF UNSPECIFIED CALF LIMITED TO BREAKDOWN OF SKIN: ICD-10-CM

## 2024-08-06 DIAGNOSIS — I11.0 HYPERTENSIVE HEART DISEASE WITH HEART FAILURE: ICD-10-CM

## 2024-08-06 DIAGNOSIS — M19.90 UNSPECIFIED OSTEOARTHRITIS, UNSPECIFIED SITE: ICD-10-CM

## 2024-08-06 DIAGNOSIS — I48.91 UNSPECIFIED ATRIAL FIBRILLATION: ICD-10-CM

## 2024-08-06 DIAGNOSIS — I50.32 CHRONIC DIASTOLIC (CONGESTIVE) HEART FAILURE: ICD-10-CM

## 2024-08-06 DIAGNOSIS — I89.0 LYMPHEDEMA, NOT ELSEWHERE CLASSIFIED: ICD-10-CM

## 2024-08-06 DIAGNOSIS — J44.9 CHRONIC OBSTRUCTIVE PULMONARY DISEASE, UNSPECIFIED: ICD-10-CM

## 2024-08-06 DIAGNOSIS — L97.815 NON-PRESSURE CHRONIC ULCER OF OTHER PART OF RIGHT LOWER LEG WITH MUSCLE INVOLVEMENT WITHOUT EVIDENCE OF NECROSIS: ICD-10-CM

## 2024-08-06 PROCEDURE — 17250 CHEM CAUT OF GRANLTJ TISSUE: CPT

## 2024-08-06 PROCEDURE — 29580 STRAPPING UNNA BOOT: CPT | Mod: 59,LT

## 2024-08-09 DIAGNOSIS — L97.828 NON-PRESSURE CHRONIC ULCER OF OTHER PART OF LEFT LOWER LEG WITH OTHER SPECIFIED SEVERITY: ICD-10-CM

## 2024-08-09 DIAGNOSIS — I11.0 HYPERTENSIVE HEART DISEASE WITH HEART FAILURE: ICD-10-CM

## 2024-08-09 DIAGNOSIS — L97.815 NON-PRESSURE CHRONIC ULCER OF OTHER PART OF RIGHT LOWER LEG WITH MUSCLE INVOLVEMENT WITHOUT EVIDENCE OF NECROSIS: ICD-10-CM

## 2024-08-09 DIAGNOSIS — I89.0 LYMPHEDEMA, NOT ELSEWHERE CLASSIFIED: ICD-10-CM

## 2024-08-09 DIAGNOSIS — J44.9 CHRONIC OBSTRUCTIVE PULMONARY DISEASE, UNSPECIFIED: ICD-10-CM

## 2024-08-09 DIAGNOSIS — M19.90 UNSPECIFIED OSTEOARTHRITIS, UNSPECIFIED SITE: ICD-10-CM

## 2024-08-09 DIAGNOSIS — I50.32 CHRONIC DIASTOLIC (CONGESTIVE) HEART FAILURE: ICD-10-CM

## 2024-08-09 DIAGNOSIS — I48.91 UNSPECIFIED ATRIAL FIBRILLATION: ICD-10-CM

## 2024-08-20 ENCOUNTER — OUTPATIENT (OUTPATIENT)
Dept: OUTPATIENT SERVICES | Facility: HOSPITAL | Age: 84
LOS: 1 days | End: 2024-08-20

## 2024-08-20 DIAGNOSIS — L97.201 NON-PRESSURE CHRONIC ULCER OF UNSPECIFIED CALF LIMITED TO BREAKDOWN OF SKIN: ICD-10-CM

## 2024-08-20 PROCEDURE — 99213 OFFICE O/P EST LOW 20 MIN: CPT

## 2024-08-22 DIAGNOSIS — L97.815 NON-PRESSURE CHRONIC ULCER OF OTHER PART OF RIGHT LOWER LEG WITH MUSCLE INVOLVEMENT WITHOUT EVIDENCE OF NECROSIS: ICD-10-CM

## 2024-08-22 DIAGNOSIS — J44.9 CHRONIC OBSTRUCTIVE PULMONARY DISEASE, UNSPECIFIED: ICD-10-CM

## 2024-08-22 DIAGNOSIS — M19.90 UNSPECIFIED OSTEOARTHRITIS, UNSPECIFIED SITE: ICD-10-CM

## 2024-08-22 DIAGNOSIS — I50.32 CHRONIC DIASTOLIC (CONGESTIVE) HEART FAILURE: ICD-10-CM

## 2024-08-22 DIAGNOSIS — L97.828 NON-PRESSURE CHRONIC ULCER OF OTHER PART OF LEFT LOWER LEG WITH OTHER SPECIFIED SEVERITY: ICD-10-CM

## 2024-08-22 DIAGNOSIS — I11.0 HYPERTENSIVE HEART DISEASE WITH HEART FAILURE: ICD-10-CM

## 2024-08-22 DIAGNOSIS — I89.0 LYMPHEDEMA, NOT ELSEWHERE CLASSIFIED: ICD-10-CM

## 2025-01-02 ENCOUNTER — EMERGENCY (EMERGENCY)
Facility: HOSPITAL | Age: 85
LOS: 1 days | Discharge: HOME | End: 2025-01-02
Attending: STUDENT IN AN ORGANIZED HEALTH CARE EDUCATION/TRAINING PROGRAM | Admitting: STUDENT IN AN ORGANIZED HEALTH CARE EDUCATION/TRAINING PROGRAM
Payer: MEDICARE

## 2025-01-02 ENCOUNTER — EMERGENCY (EMERGENCY)
Facility: HOSPITAL | Age: 85
LOS: 0 days | Discharge: ANOTHER HOSPITAL | End: 2025-01-02
Attending: STUDENT IN AN ORGANIZED HEALTH CARE EDUCATION/TRAINING PROGRAM
Payer: MEDICARE

## 2025-01-02 VITALS
RESPIRATION RATE: 18 BRPM | HEART RATE: 97 BPM | DIASTOLIC BLOOD PRESSURE: 93 MMHG | TEMPERATURE: 98 F | OXYGEN SATURATION: 95 % | SYSTOLIC BLOOD PRESSURE: 164 MMHG

## 2025-01-02 VITALS
TEMPERATURE: 98 F | DIASTOLIC BLOOD PRESSURE: 94 MMHG | WEIGHT: 110.23 LBS | SYSTOLIC BLOOD PRESSURE: 160 MMHG | OXYGEN SATURATION: 100 % | RESPIRATION RATE: 18 BRPM | HEART RATE: 91 BPM

## 2025-01-02 VITALS
TEMPERATURE: 98 F | OXYGEN SATURATION: 95 % | DIASTOLIC BLOOD PRESSURE: 100 MMHG | HEART RATE: 111 BPM | SYSTOLIC BLOOD PRESSURE: 176 MMHG | RESPIRATION RATE: 16 BRPM

## 2025-01-02 VITALS
SYSTOLIC BLOOD PRESSURE: 163 MMHG | DIASTOLIC BLOOD PRESSURE: 94 MMHG | HEART RATE: 108 BPM | RESPIRATION RATE: 18 BRPM | TEMPERATURE: 98 F | OXYGEN SATURATION: 95 %
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
## 2025-01-02 DIAGNOSIS — I48.91 UNSPECIFIED ATRIAL FIBRILLATION: ICD-10-CM
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
## 2025-01-02 DIAGNOSIS — S00.11XA CONTUSION OF RIGHT EYELID AND PERIOCULAR AREA, INITIAL ENCOUNTER: ICD-10-CM

## 2025-01-02 DIAGNOSIS — S80.211A ABRASION, RIGHT KNEE, INITIAL ENCOUNTER: ICD-10-CM

## 2025-01-02 DIAGNOSIS — S80.212A ABRASION, LEFT KNEE, INITIAL ENCOUNTER: ICD-10-CM

## 2025-01-02 DIAGNOSIS — Z23 ENCOUNTER FOR IMMUNIZATION: ICD-10-CM

## 2025-01-02 DIAGNOSIS — I11.0 HYPERTENSIVE HEART DISEASE WITH HEART FAILURE: ICD-10-CM

## 2025-01-02 DIAGNOSIS — I50.9 HEART FAILURE, UNSPECIFIED: ICD-10-CM

## 2025-01-02 LAB
ALBUMIN SERPL ELPH-MCNC: 4 G/DL — SIGNIFICANT CHANGE UP (ref 3.3–5)
ALP SERPL-CCNC: 99 U/L — SIGNIFICANT CHANGE UP (ref 40–120)
ALT FLD-CCNC: 73 U/L — SIGNIFICANT CHANGE UP (ref 12–78)
ANION GAP SERPL CALC-SCNC: 4 MMOL/L — LOW (ref 5–17)
APTT BLD: 26.3 SEC — SIGNIFICANT CHANGE UP (ref 24.5–35.6)
AST SERPL-CCNC: 82 U/L — HIGH (ref 15–37)
BASOPHILS # BLD AUTO: 0.05 K/UL — SIGNIFICANT CHANGE UP (ref 0–0.2)
BASOPHILS NFR BLD AUTO: 0.4 % — SIGNIFICANT CHANGE UP (ref 0–2)
BILIRUB SERPL-MCNC: 1.1 MG/DL — SIGNIFICANT CHANGE UP (ref 0.2–1.2)
BLD GP AB SCN SERPL QL: SIGNIFICANT CHANGE UP
BUN SERPL-MCNC: 18 MG/DL — SIGNIFICANT CHANGE UP (ref 7–23)
CALCIUM SERPL-MCNC: 8.7 MG/DL — SIGNIFICANT CHANGE UP (ref 8.5–10.1)
CHLORIDE SERPL-SCNC: 91 MMOL/L — LOW (ref 96–108)
CO2 SERPL-SCNC: 35 MMOL/L — HIGH (ref 22–31)
CREAT SERPL-MCNC: 0.69 MG/DL — SIGNIFICANT CHANGE UP (ref 0.5–1.3)
EGFR: 86 ML/MIN/1.73M2 — SIGNIFICANT CHANGE UP
EOSINOPHIL # BLD AUTO: 0.02 K/UL — SIGNIFICANT CHANGE UP (ref 0–0.5)
EOSINOPHIL NFR BLD AUTO: 0.1 % — SIGNIFICANT CHANGE UP (ref 0–6)
FLUAV AG NPH QL: SIGNIFICANT CHANGE UP
FLUBV AG NPH QL: SIGNIFICANT CHANGE UP
GLUCOSE SERPL-MCNC: 106 MG/DL — HIGH (ref 70–99)
HCT VFR BLD CALC: 44.1 % — SIGNIFICANT CHANGE UP (ref 34.5–45)
HGB BLD-MCNC: 14.8 G/DL — SIGNIFICANT CHANGE UP (ref 11.5–15.5)
IMM GRANULOCYTES NFR BLD AUTO: 0.4 % — SIGNIFICANT CHANGE UP (ref 0–0.9)
INR BLD: 0.97 RATIO — SIGNIFICANT CHANGE UP (ref 0.85–1.16)
LYMPHOCYTES # BLD AUTO: 0.71 K/UL — LOW (ref 1–3.3)
LYMPHOCYTES # BLD AUTO: 5.1 % — LOW (ref 13–44)
MCHC RBC-ENTMCNC: 33.6 G/DL — SIGNIFICANT CHANGE UP (ref 32–36)
MCHC RBC-ENTMCNC: 34.3 PG — HIGH (ref 27–34)
MCV RBC AUTO: 102.3 FL — HIGH (ref 80–100)
MONOCYTES # BLD AUTO: 0.97 K/UL — HIGH (ref 0–0.9)
MONOCYTES NFR BLD AUTO: 7 % — SIGNIFICANT CHANGE UP (ref 2–14)
NEUTROPHILS # BLD AUTO: 12.11 K/UL — HIGH (ref 1.8–7.4)
NEUTROPHILS NFR BLD AUTO: 87 % — HIGH (ref 43–77)
PLATELET # BLD AUTO: 247 K/UL — SIGNIFICANT CHANGE UP (ref 150–400)
POTASSIUM SERPL-MCNC: 4.2 MMOL/L — SIGNIFICANT CHANGE UP (ref 3.5–5.3)
POTASSIUM SERPL-SCNC: 4.2 MMOL/L — SIGNIFICANT CHANGE UP (ref 3.5–5.3)
PROT SERPL-MCNC: 6.9 GM/DL — SIGNIFICANT CHANGE UP (ref 6–8.3)
PROTHROM AB SERPL-ACNC: 11.2 SEC — SIGNIFICANT CHANGE UP (ref 9.9–13.4)
RBC # BLD: 4.31 M/UL — SIGNIFICANT CHANGE UP (ref 3.8–5.2)
RBC # FLD: 14.5 % — SIGNIFICANT CHANGE UP (ref 10.3–14.5)
RSV RNA NPH QL NAA+NON-PROBE: SIGNIFICANT CHANGE UP
SARS-COV-2 RNA SPEC QL NAA+PROBE: SIGNIFICANT CHANGE UP
SODIUM SERPL-SCNC: 130 MMOL/L — LOW (ref 135–145)
WBC # BLD: 13.92 K/UL — HIGH (ref 3.8–10.5)
WBC # FLD AUTO: 13.92 K/UL — HIGH (ref 3.8–10.5)

## 2025-01-02 PROCEDURE — 86850 RBC ANTIBODY SCREEN: CPT

## 2025-01-02 PROCEDURE — 90471 IMMUNIZATION ADMIN: CPT

## 2025-01-02 PROCEDURE — 72125 CT NECK SPINE W/O DYE: CPT | Mod: 26,MC

## 2025-01-02 PROCEDURE — 86900 BLOOD TYPING SEROLOGIC ABO: CPT

## 2025-01-02 PROCEDURE — 70486 CT MAXILLOFACIAL W/O DYE: CPT | Mod: 26,MC

## 2025-01-02 PROCEDURE — 76376 3D RENDER W/INTRP POSTPROCES: CPT

## 2025-01-02 PROCEDURE — 86901 BLOOD TYPING SEROLOGIC RH(D): CPT

## 2025-01-02 PROCEDURE — 71045 X-RAY EXAM CHEST 1 VIEW: CPT

## 2025-01-02 PROCEDURE — 93005 ELECTROCARDIOGRAM TRACING: CPT

## 2025-01-02 PROCEDURE — 70450 CT HEAD/BRAIN W/O DYE: CPT | Mod: MC

## 2025-01-02 PROCEDURE — 99284 EMERGENCY DEPT VISIT MOD MDM: CPT

## 2025-01-02 PROCEDURE — 90715 TDAP VACCINE 7 YRS/> IM: CPT

## 2025-01-02 PROCEDURE — 71045 X-RAY EXAM CHEST 1 VIEW: CPT | Mod: 26

## 2025-01-02 PROCEDURE — 0241U: CPT

## 2025-01-02 PROCEDURE — 72125 CT NECK SPINE W/O DYE: CPT | Mod: MC

## 2025-01-02 PROCEDURE — 85025 COMPLETE CBC W/AUTO DIFF WBC: CPT

## 2025-01-02 PROCEDURE — 85610 PROTHROMBIN TIME: CPT

## 2025-01-02 PROCEDURE — 85730 THROMBOPLASTIN TIME PARTIAL: CPT

## 2025-01-02 PROCEDURE — 36415 COLL VENOUS BLD VENIPUNCTURE: CPT

## 2025-01-02 PROCEDURE — 99291 CRITICAL CARE FIRST HOUR: CPT

## 2025-01-02 PROCEDURE — 93010 ELECTROCARDIOGRAM REPORT: CPT

## 2025-01-02 PROCEDURE — 76376 3D RENDER W/INTRP POSTPROCES: CPT | Mod: 26

## 2025-01-02 PROCEDURE — 73562 X-RAY EXAM OF KNEE 3: CPT | Mod: 26,50

## 2025-01-02 PROCEDURE — 70450 CT HEAD/BRAIN W/O DYE: CPT | Mod: 26,MC

## 2025-01-02 PROCEDURE — 70486 CT MAXILLOFACIAL W/O DYE: CPT | Mod: MC

## 2025-01-02 PROCEDURE — 80053 COMPREHEN METABOLIC PANEL: CPT

## 2025-01-02 PROCEDURE — 99285 EMERGENCY DEPT VISIT HI MDM: CPT | Mod: 25

## 2025-01-02 RX ORDER — ERYTHROMYCIN BASE 5 MG/GRAM
1 OINTMENT (GRAM) OPHTHALMIC (EYE) ONCE
Refills: 0 | Status: COMPLETED | OUTPATIENT
Start: 2025-01-02 | End: 2025-01-02

## 2025-01-02 RX ORDER — TETANUS TOXOID, REDUCED DIPHTHERIA TOXOID AND ACELLULAR PERTUSSIS VACCINE, ADSORBED 5; 2.5; 8; 8; 2.5 [IU]/.5ML; [IU]/.5ML; UG/.5ML; UG/.5ML; UG/.5ML
0.5 SUSPENSION INTRAMUSCULAR ONCE
Refills: 0 | Status: COMPLETED | OUTPATIENT
Start: 2025-01-02 | End: 2025-01-02

## 2025-01-02 RX ADMIN — TETANUS TOXOID, REDUCED DIPHTHERIA TOXOID AND ACELLULAR PERTUSSIS VACCINE, ADSORBED 0.5 MILLILITER(S): 5; 2.5; 8; 8; 2.5 SUSPENSION INTRAMUSCULAR at 14:54

## 2025-01-02 RX ADMIN — Medication 1 APPLICATION(S): at 14:53

## 2025-01-02 NOTE — ED PROVIDER NOTE - NSFOLLOWUPINSTRUCTIONS_ED_ALL_ED_FT
The ophthalmology team saw you in the hospital they said that you are okay to go home.  The bruising may start to look worse before it will look better it will likely settle over a lot a large amount of the face and become purple before it gets better this does not mean that there is anything wrong.    Please your primary care doctor within 1 week.    Please see the ophthalmologist below.    Please return to the ER immediately if you develop worsening blurry vision in the eye, you develop loss of vision, you develop double vision or pain in the eye that is worse, or any other new or concerning symptoms such as shortness of breath chest pain severe nausea vomiting.    We have given you eye ointment and artificial tears to help with your symptoms and to keep the eye moist.  We recommend not putting these on at the same time, you can put in the eye ointment and then 1 hour later put in the artificial tears. Use the ointment (the thick paste) 4 times per day, you can use the liquid artificial tears 6 times per day.

## 2025-01-02 NOTE — ED ADULT NURSE NOTE - CHIEF COMPLAINT QUOTE
Patient brought to ER by EMS from [place] as a transfer to see opthamology. Patient fell in her driveway this morning and suffered a large hematoma to right eye. No LOC. 20 gauge to right forearm.

## 2025-01-02 NOTE — ED PROVIDER NOTE - NSFOLLOWUPCLINICS_GEN_ALL_ED_FT
[facility] - Ophthalmology  Ophthalmology  [address]  [place] [address]  Phone: [phone]  Fax:   Follow Up Time: 1-3 Days

## 2025-01-02 NOTE — ED ADULT NURSE NOTE - NSFALLRISKINTERV_ED_ALL_ED
Assistance OOB with selected safe patient handling equipment if applicable/Assistance with ambulation/Communicate fall risk and risk factors to all staff, patient, and family/Monitor gait and stability/Provide visual cue: yellow wristband, yellow gown, etc/Reinforce activity limits and safety measures with patient and family/Call bell, personal items and telephone in reach/Instruct patient to call for assistance before getting out of bed/chair/stretcher/Non-slip footwear applied when patient is off stretcher/[place] to call system/Physically safe environment - no spills, clutter or unnecessary equipment/Purposeful Proactive Rounding/Room/bathroom lighting operational, light cord in reach

## 2025-01-02 NOTE — ED ADULT TRIAGE NOTE - CHIEF COMPLAINT QUOTE
Pt BIBEMS from home, c/o head injury s/p mechanical trip and fall in the drive way. +head strike. -LOC. -AC/ASA use. R eye noted to be swollen shut with hematoma. B/l knee abrasions noted. Arrives in C-collar. DtTita Purcell to triage. Neuro alert called at 1225. Brought directly to CT scan. GCS 15.

## 2025-01-02 NOTE — ED PROVIDER NOTE - OBJECTIVE STATEMENT
DO John, EM Attendin-year-old female with a past medical history of CHF on Lasix intermittently, hypertension, A-fib not on AC presenting due to concern of a fall initially presented to [facility] due to a large hematoma over her right eye.  Had a CT performed that did not show any fractures or intracranial hemorrhage.  On exam there she had elevated intraocular pressure of her right eye to 38, transferred for ophthalmology.  Also fell to her knees and has abrasions on her knees bilaterally, was able to ambulate since the fall.  She denies any symptoms predating the fall.  No loss of consciousness, no anticoagulation or aspirin use.

## 2025-01-02 NOTE — ED PROVIDER NOTE - PROGRESS NOTE DETAILS
DO John, EM Attending: The cervical collar was removed since the following conditions were met: The patient has shown gross motor function of all four extremities. The patient has no paresthesias or neurologic symptoms. The patient is able to range the neck. The attending radiologist has dictated a final report of a high quality CT scan of the cervical spine and it shows no cervical spine fracture or acute abnormality. DO John, EM Attending: Spoke with ophthalmology regarding patient.  They states she is likely cleared, needs to do a slit exam for need of the collar to be taken off to do this collar has since been removed.  They will come and evaluate her however likely recommendations discharged with ice packs to the eye. Cynthia DUMAS, EM/IM PGY-4: ophtho cleared pt, ready to go, safe for DC with outpt follow up.

## 2025-01-02 NOTE — ED ADULT NURSE NOTE - NSFALLHARMRISKINTERV_ED_ALL_ED

## 2025-01-02 NOTE — ED PROVIDER NOTE - PHYSICAL EXAMINATION
Constitutional: well appearing, NAD AAOx3  Eyes: EOMI, PERRL  Head: Normocephalic +right periorbital hematoma  Mouth: no airway obstruction, posterior oropharynx clear without erythema or exudate  Neck: supple  Cardiac: regular rate and rhythm, no MRG  Resp: Lungs CTAB  GI: Abd s/nt/nd  Neuro: CN2-12 intact, strength 5/5x4, sensation grossly intact  Skin: No rashes  MSK: b/l knee abrasions without deformity, normal rom b/l knees

## 2025-01-02 NOTE — CONSULT NOTE ADULT - ASSESSMENT
Assessment and Recommendations:  84y female with a past medical history/ocular history of *** consulted for ***, found to have ***    #severe periorbital ecchymosis   Seen and discussed with ***.    Outpatient Follow-up: Patient should follow-up with his/her ophthalmologist or with [facility] Department of Ophthalmology within 1 week of after discharge at:    [address]  [place] [address]  [phone]    Malinda Peace MD, PGY-2  Contact: [employer] Teams     Assessment and Recommendations:  84y female with a past medical history/ocular history of PCIOL OU consulted for orbital trauma, found to have severe periorbital ecchymosis     #severe periorbital ecchymosis OD  - Va 20/50 cc OD, IOP 19 OD PERRLA, no red desaturation, EOM full   - Anterior exam with formed AC, no hyphema, no evidence of globe rupture, no abrasions, DFE without evidence of RD, commotio or hemorrhage iso trauma   - The orbits are unremarkable.  Preseptal structures are preserved.  The   globes are intact.  Intraconal and extraconal fat is preserved.  The   extraocular muscles remain symmetric.  The superior ophthalmic veins are   also symmetric.  Orbital rims remain intact.  - RD precautions stressed  - HOB elevation, cold compresses, erythromycin ointment to abrasion of lateral lower brow, preservative free artificial tears 6x/day to both eyes   - patient to follow up with her ophthalmology outpatient physician within 1 week of discharge. If she would like to make an appointment she can follow up at the address below     Seen and discussed with Dr. Quan       Outpatient Follow-up: Patient should follow-up with his/her ophthalmologist or with [facility] Department of Ophthalmology within 1 week of after discharge at:    [address]  [place] [address]  [phone]    Malinda Peace MD, PGY-2  Contact: [employer] Teams     Assessment and Recommendations:  84y female with a past medical history/ocular history of PCIOL OU consulted for orbital trauma, found to have severe periorbital ecchymosis     #severe periorbital ecchymosis OD  - Va 20/50 cc OD, IOP 19 OD PERRLA, no red desaturation, EOM full   - Anterior exam with formed AC, no hyphema, no evidence of globe rupture, no abrasions, DFE without evidence of RD, commotio or hemorrhage iso trauma   - The orbits are unremarkable.  Preseptal structures are preserved.  The   globes are intact.  Intraconal and extraconal fat is preserved.  The   extraocular muscles remain symmetric.  The superior ophthalmic veins are   also symmetric.  Orbital rims remain intact.  - RD precautions stressed  - HOB elevation, cold compresses, erythromycin ointment to abrasion of lateral lower brow 4x/day, preservative free artificial tears 6x/day to both eyes   - patient to follow up with her ophthalmology outpatient physician within 1 week of discharge. If she would like to make an appointment she can follow up at the address below     Seen and discussed with Dr. Quan       Outpatient Follow-up: Patient should follow-up with his/her ophthalmologist or with [facility] Department of Ophthalmology within 1 week of after discharge at:    [address]  [place] [address]  [phone]    Malinda Peace MD, PGY-2  Contact: [employer] Teams

## 2025-01-02 NOTE — ED PROVIDER NOTE - PHYSICAL EXAMINATION
DO John, EM Attending: General:  no acute distress  Head: large right periorbital hematoma, oozing blood, normocephalic  Eyes:  no scleral icterus, no discharge  ENT: moist mucous membranes  Neurology: A&Ox 3, nonfocal, WHITE x 4  Respiratory:  normal respiratory effort  CV:  Extremities warm and well perfused  Abdominal: Soft, non-distended  Extremities: No edema, no deformities  Skin: warm and dry. No rashes, scattered abrasions/skin tears along bilaterally knees anteriorly  Spine: no midline spinal tenderness, step off or crepitus

## 2025-01-02 NOTE — ED PROVIDER NOTE - OBJECTIVE STATEMENT
84yoF PMH CHF, HTN p/w right eye swelling s/p trip and fall in driveway. No LOC or AC use. Endorses abrasions to b/l knees without pain, has ambulated since fall. No HA, numbness tingling weakness, back pain, cp, sob, nvd, abdominal pain, urinary symptoms, fever or chills. 84yoF PMH CHF, HTN, Afib not on AC p/w right eye swelling s/p trip and fall in driveway. No LOC or AC use. Endorses abrasions to b/l knees without pain, has ambulated since fall. No HA, numbness tingling weakness, back pain, cp, sob, nvd, abdominal pain, urinary symptoms, fever or chills.

## 2025-01-02 NOTE — ED ADULT TRIAGE NOTE - CHIEF COMPLAINT QUOTE
Patient brought to ER by EMS from [place] as a transfer to see opthamology. Patient fell in her driveway this morning and suffered a large hematoma to right eye. No LOC. 20 gauge to right forearm.

## 2025-01-02 NOTE — CONSULT NOTE ADULT - SUBJECTIVE AND OBJECTIVE BOX
[facility] DEPARTMENT OF OPHTHALMOLOGY - INITIAL ADULT CONSULT  -----------------------------------------------------------------------------  Malinda Peace MD PGY-2   Contact: TEAMS  -----------------------------------------------------------------------------    HPI:    Interval History: ***    PMH: ***  POcHx: denies surg/laser  FH: denies glc/amd  Social History: denies etoh/tobacco  Ophthalmic Medications: none  Allergies: NKDA    Review of Systems:  Constitutional: No fever, chills  Eyes: No blurry vision, flashes, floaters, FBS, erythema, discharge, double vision, OU  Neuro: No tremors  Cardiovascular: No chest pain, palpitations  Respiratory: No SOB, no cough  GI: No nausea, vomiting, abdominal pain  : No dysuria  Skin: no rash  Psych: no depression  Endocrine: no polyuria, polydipsia  Heme/lymph: no swelling    VITALS: T(C): 36.8 (01-02-25 @ 17:18)  T(F): 98.2 (01-02-25 @ 17:18), Max: 98.2 (01-02-25 @ 17:18)  HR: 108 (01-02-25 @ 17:18) (91 - 111)  BP: 140/89 (01-02-25 @ 17:18) (140/89 - 176/100)  RR:  (16 - 18)  SpO2:  (95% - 100%)  Wt(kg): --  General: AAO x 3, appropriate mood and affect    Ophthalmology Exam:  Visual acuity (sc): 20/20 OD, 20/20 OS  Pupils: PERRL OU, no RAPD  Ttono: 16 OD 16 OS  Extraocular movements (EOMs): Full OU, no pain, no diplopia  Confrontational Visual Field (CVF): Full OD, Full OS  Color Plates: 12/12 OD, 12/12 OS    Pen Light Exam (PLE)  External: Flat OU  Lids/Lashes/Lacrimal Ducts: Flat OU    Sclera/Conjunctiva: W+Q OU  Cornea: Cl OU  Anterior Chamber: D+F OU    Iris: Flat OU  Lens: Cl OU    Fundus Exam: dilated with 1% tropicamide and 2.5% phenylephrine  Approval obtained from primary team for dilation  Patient aware that pupils can remained dilated for at least 4-6 hours  Exam performed with 20D lens    Vitreous: wnl OU  Disc, cup/disc: sharp and pink, 0.4 OU  Macula: Flat OU  Vessels: Normal caliber OU  Periphery: flat OU    Labs/Imaging:  *** [facility] DEPARTMENT OF OPHTHALMOLOGY - INITIAL ADULT CONSULT  -----------------------------------------------------------------------------  Malinda Peaec MD PGY-2   Contact: TEAMS  -----------------------------------------------------------------------------    HPI: 84-year-old female with a past medical history of CHF on Lasix intermittently, hypertension, A-fib not on AC presenting due to concern of a fall initially presented to [facility] due to a large hematoma over her right eye.  Had a CT performed that did not show any fractures or intracranial hemorrhage.  On exam there she had elevated intraocular pressure of her right eye to 38, transferred for ophthalmology.  Also fell to her knees and has abrasions on her knees bilaterally, was able to ambulate since the fall.  She denies any symptoms predating the fall.  No loss of consciousness, no anticoagulation or aspirin use.    Interval History: PT denies uses of blood thinners, no flashes/floaters/vision loss no pain, no nausea, no vomiting     PMH: as above   POcHx: PCIOL OU   FH: denies glc/amd  Social History: denies etoh/tobacco  Ophthalmic Medications: none  Allergies: NKDA    Review of Systems:  Constitutional: No fever, chills  Eyes: No blurry vision, flashes, floaters, FBS, erythema, discharge, double vision, OU  Neuro: No tremors  Cardiovascular: No chest pain, palpitations  Respiratory: No SOB, no cough  GI: No nausea, vomiting, abdominal pain  : No dysuria  Skin: no rash  Psych: no depression  Endocrine: no polyuria, polydipsia  Heme/lymph: no swelling    VITALS: T(C): 36.8 (01-02-25 @ 17:18)  T(F): 98.2 (01-02-25 @ 17:18), Max: 98.2 (01-02-25 @ 17:18)  HR: 108 (01-02-25 @ 17:18) (91 - 111)  BP: 140/89 (01-02-25 @ 17:18) (140/89 - 176/100)  RR:  (16 - 18)  SpO2:  (95% - 100%)  Wt(kg): --  General: AAO x 3, appropriate mood and affect    Ophthalmology Exam:  Visual acuity (sc): 20/50 cc OD, 20/50 PH 20/30 OS  Pupils: PERRL OU, no RAPD  Ttono: 19 OD 19  OS  Extraocular movements (EOMs): Full OU, no pain, no diplopia  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, no red top desaturation     Pen Light Exam (PLE)  External: Flat OU  Lids/Lashes/Lacrimal Ducts: Flat OU    Sclera/Conjunctiva: W+Q OU  Cornea: Cl OU  Anterior Chamber: D+F OU    Iris: Flat OU  Lens: Cl OU    Fundus Exam: dilated with 1% tropicamide and 2.5% phenylephrine  Approval obtained from primary team for dilation  Patient aware that pupils can remained dilated for at least 4-6 hours  Exam performed with 20D lens    Vitreous: wnl OU  Disc, cup/disc: sharp and pink, 0.2 OU  Macula: Flat OU  Vessels: Normal caliber OU  Periphery: flat no tears holes detachments OD, no commotio OD flat OS    Labs/Imaging:  *** [facility] DEPARTMENT OF OPHTHALMOLOGY - INITIAL ADULT CONSULT  -----------------------------------------------------------------------------  Malinda ePace MD PGY-2   Contact: TEAMS  -----------------------------------------------------------------------------    HPI: 84-year-old female with a past medical history of CHF on Lasix intermittently, hypertension, A-fib not on AC presenting due to concern of a fall initially presented to [facility] due to a large hematoma over her right eye.  Had a CT performed that did not show any fractures or intracranial hemorrhage.  On exam there she had elevated intraocular pressure of her right eye to 38, transferred for ophthalmology.  Also fell to her knees and has abrasions on her knees bilaterally, was able to ambulate since the fall.  She denies any symptoms predating the fall.  No loss of consciousness, no anticoagulation or aspirin use.    Interval History: PT denies uses of blood thinners, no flashes/floaters/vision loss no pain, no nausea, no vomiting     PMH: as above   POcHx: PCIOL OU   FH: denies glc/amd  Social History: denies etoh/tobacco  Ophthalmic Medications: none  Allergies: NKDA    Review of Systems:  Constitutional: No fever, chills  Eyes: (+) blurry vision, (-) flashes, floaters,  (+) FBS, erythema, discharge, (-) double vision, OD  Neuro: No tremors  Cardiovascular: No chest pain, palpitations  Respiratory: No SOB, no cough  GI: No nausea, vomiting, abdominal pain  : No dysuria  Skin: no rash  Psych: no depression  Endocrine: no polyuria, polydipsia  Heme/lymph: no swelling    VITALS: T(C): 36.8 (01-02-25 @ 17:18)  T(F): 98.2 (01-02-25 @ 17:18), Max: 98.2 (01-02-25 @ 17:18)  HR: 108 (01-02-25 @ 17:18) (91 - 111)  BP: 140/89 (01-02-25 @ 17:18) (140/89 - 176/100)  RR:  (16 - 18)  SpO2:  (95% - 100%)  Wt(kg): --  General: AAO x 3, appropriate mood and affect    Ophthalmology Exam:  Visual acuity (sc): 20/50 cc OD, 20/50 PH 20/30 OS  Pupils: PERRL OU, no RAPD  Ttono: 19 OD 19  OS  Extraocular movements (EOMs): Full OU, no pain, no diplopia  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, no red top desaturation     Pen Light Exam (PLE)  External: severe periorbital echymosis OD nasal side of oribt mild echymosis OS   Lids/Lashes/Lacrimal Ducts: As above OD  Flat OS  Sclera/Conjunctiva: ARMEN with 1+ chemosis OD w/q OS  Cornea: Cl OU  Anterior Chamber: D+F OU    Iris: Flat OU  Lens: PCIOL OU    Fundus Exam: dilated with 1% tropicamide and 2.5% phenylephrine  Approval obtained from primary team for dilation  Patient aware that pupils can remained dilated for at least 4-6 hours  Exam performed with 20D lens    Vitreous: wnl OU  Disc, cup/disc: sharp and pink, 0.2 OU  Macula: Flat OU  Vessels: Normal caliber OU  Periphery: flat no tears holes detachments OD, no commotio OD flat OS    Labs/Imaging:  ***

## 2025-01-02 NOTE — ED ADULT NURSE NOTE - OBJECTIVE STATEMENT
Patient received to room 5a, from [facility], A&Ox4 ambulatory accompanied by daughter, english speaking, Coming to the Ed for fall, with facial trauma,   Patient states fall, no LOC, no blood thinners, no dizziness, headache, weakness, patient denies chest pain, SOB, abdominal pain, GI/ complaints, Patient received to room 5a, from [facility], A&Ox4 ambulatory accompanied by daughter, english speaking, Coming to the Ed for fall, with facial trauma,   Patient states fall, no LOC, no blood thinners, no dizziness, headache, weakness, patient denies chest pain, SOB, abdominal pain, GI/ complaints, no blurry vision, patient breathing is even and nonlabored, history of afib,  small skin tear on Right eyebrow, with edema over R eye, patient unable to see out of R eye due to swelling, PERRLA pos to Left Eye, dressing applied to R eye for breakthrough bleeding, IV 20 R ac, Patient denies any other swelling or pain anywhere else in the body. Patient with both side rails up, safety maintained.

## 2025-01-02 NOTE — ED ADULT NURSE REASSESSMENT NOTE - NS ED NURSE REASSESS COMMENT FT1
Pt received by dayshift RN, resting in stretcher breathing even and unlabored. Vitals as documented. Hematoma noted to R eye, provider at bedside dressing and assessing. Pt offering no complaints at this time, denies pain to eye, denies SOB, chest pain, dizziness, weakness, n/v. No acute distress noted at this time. Bed in lowest, side rails up, call bell in reach. Awaiting optho consult at this time.

## 2025-01-02 NOTE — ED PROVIDER NOTE - CLINICAL SUMMARY MEDICAL DECISION MAKING FREE TEXT BOX
Presentation most concerning for facial fx, ICH. NA initiated upon arrival.   CT imaging Presentation most concerning for facial fx, ICH. NA initiated upon arrival.   Exam shows no proptosis of right eye, no entrapment, IOP 38, VA 20/50.   CT imaging Presentation most concerning for facial fx, ICH. NA initiated upon arrival.   Exam shows no proptosis of right eye, no entrapment, IOP 38, VA 20/50.   CT imaging reviewed with Dr. Cade radiology attending who states there is no retrobulbar hematoma, no orbital fx, no acute appearing changes of the right glove. Case discussed with Dr. Denny resident ophthalmology physician at [facility], accepts pt to [facility] under Dr. Moses ophthamology attending ER to ER transfer.

## 2025-01-02 NOTE — ED PROVIDER NOTE - CLINICAL SUMMARY MEDICAL DECISION MAKING FREE TEXT BOX
DO John, EM Attendin-year-old female with a past medical history of CHF on Lasix intermittently, hypertension, A-fib not on AC presenting due to concern of a fall initially presented to [facility] due to a large hematoma over her right eye. Awaiting final ophthalmology recommendations, no indication for additional testing at this time, knee x-rays ordered as patient did not have them performed at [place], will likely apply bacitracin to her knee abrasions and skin tears.  Dispo pending xrays and ophtho/

## 2025-01-02 NOTE — ED PROVIDER NOTE - PATIENT PORTAL LINK FT
You can access the FollowMyHealth Patient Portal offered by [facility] by registering at the following website: [email or web address]. By joining [employer]’s FollowMyHealth portal, you will also be able to view your health information using other applications (apps) compatible with our system.

## 2025-01-02 NOTE — ED PROVIDER NOTE - CCCP TRG CHIEF CMPLNT
[facility]     Report of Consultation    [address]  [phone]       Mihai Coffman Patient Status:  Inpatient    1972 MRN [id number]   [facility] 3SW-A Attending Vincent Pastor MD   [facility] Day # 1 PCP Kalyan Bonner tobacco use about 6 years ago. He reports previous alcohol use. He reports that he does not use drugs.     Allergies:  No Known Allergies    Medications:    Current Facility-Administered Medications:   •  HYDROcodone-acetaminophen (NORCO)  MG per tab penoscrotal junction ventrally which appears stenotic. Testes are descended bilaterally.        Extremities:   Extremities normal   Skin:   Skin color, texture, turgor normal, no rashes or lesions   Lymph nodes:   No lymphadenopathy   Neurologic:   Grossly Jose Rivera M.D.   [facility]    [phone] eye pain traumatic

## 2025-01-02 NOTE — ED PROVIDER NOTE - ATTENDING CONTRIBUTION TO CARE
John CARRANZA: Please see the HPI, ROS, PE and MDM as authored by me. I personally made the management plan, and take responsibility for the patient's management.

## 2025-01-02 NOTE — ED ADULT NURSE NOTE - OBJECTIVE STATEMENT
Pt presents to ED s/p mechanical trip and fall in driveway. +headstrike, -LOC, not on AC. Pt w/ r orbital hematoma.

## 2025-01-10 ENCOUNTER — OUTPATIENT (OUTPATIENT)
Dept: OUTPATIENT SERVICES | Facility: HOSPITAL | Age: 85
LOS: 1 days | End: 2025-01-10
Payer: MEDICARE
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
## 2025-01-10 DIAGNOSIS — L97.812 NON-PRESSURE CHRONIC ULCER OF OTHER PART OF RIGHT LOWER LEG WITH FAT LAYER EXPOSED: ICD-10-CM

## 2025-01-10 DIAGNOSIS — S81.002A UNSPECIFIED OPEN WOUND, LEFT KNEE, INITIAL ENCOUNTER: ICD-10-CM

## 2025-01-10 DIAGNOSIS — L97.822 NON-PRESSURE CHRONIC ULCER OF OTHER PART OF LEFT LOWER LEG WITH FAT LAYER EXPOSED: ICD-10-CM

## 2025-01-10 DIAGNOSIS — I11.0 HYPERTENSIVE HEART DISEASE WITH HEART FAILURE: ICD-10-CM

## 2025-01-10 DIAGNOSIS — Z91.81 HISTORY OF FALLING: ICD-10-CM

## 2025-01-10 DIAGNOSIS — I50.9 HEART FAILURE, UNSPECIFIED: ICD-10-CM

## 2025-01-10 DIAGNOSIS — J44.9 CHRONIC OBSTRUCTIVE PULMONARY DISEASE, UNSPECIFIED: ICD-10-CM
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
## 2025-01-10 DIAGNOSIS — M19.90 UNSPECIFIED OSTEOARTHRITIS, UNSPECIFIED SITE: ICD-10-CM

## 2025-01-10 DIAGNOSIS — I87.2 VENOUS INSUFFICIENCY (CHRONIC) (PERIPHERAL): ICD-10-CM
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
## 2025-01-10 DIAGNOSIS — I73.9 PERIPHERAL VASCULAR DISEASE, UNSPECIFIED: ICD-10-CM

## 2025-01-10 PROBLEM — I48.91 UNSPECIFIED ATRIAL FIBRILLATION: Chronic | Status: ACTIVE | Noted: 2025-01-02

## 2025-01-10 PROCEDURE — 99213 OFFICE O/P EST LOW 20 MIN: CPT

## 2025-01-17 ENCOUNTER — OUTPATIENT (OUTPATIENT)
Dept: OUTPATIENT SERVICES | Facility: HOSPITAL | Age: 85
LOS: 1 days | End: 2025-01-17

## 2025-01-17 DIAGNOSIS — S81.801A UNSPECIFIED OPEN WOUND, RIGHT LOWER LEG, INITIAL ENCOUNTER: ICD-10-CM

## 2025-01-17 PROCEDURE — 99213 OFFICE O/P EST LOW 20 MIN: CPT

## 2025-01-24 ENCOUNTER — APPOINTMENT (OUTPATIENT)
Dept: VASCULAR SURGERY | Facility: CLINIC | Age: 85
End: 2025-01-24

## 2025-01-24 ENCOUNTER — APPOINTMENT (OUTPATIENT)
Dept: VASCULAR SURGERY | Facility: CLINIC | Age: 85
End: 2025-01-24
Payer: MEDICARE

## 2025-01-24 DIAGNOSIS — Z86.79 PERSONAL HISTORY OF OTHER DISEASES OF THE CIRCULATORY SYSTEM: ICD-10-CM

## 2025-01-24 DIAGNOSIS — I50.9 HEART FAILURE, UNSPECIFIED: ICD-10-CM

## 2025-01-24 DIAGNOSIS — I48.91 UNSPECIFIED ATRIAL FIBRILLATION: ICD-10-CM

## 2025-01-24 PROCEDURE — 99213 OFFICE O/P EST LOW 20 MIN: CPT

## 2025-01-24 PROCEDURE — 93925 LOWER EXTREMITY STUDY: CPT

## 2025-01-24 RX ORDER — FUROSEMIDE 80 MG/1
TABLET ORAL
Refills: 0 | Status: ACTIVE | COMMUNITY

## 2025-01-24 RX ORDER — SPIRONOLACTONE 25 MG/1
25 TABLET ORAL
Refills: 0 | Status: ACTIVE | COMMUNITY

## 2025-01-27 PROBLEM — Z86.79 HISTORY OF HYPERTENSION: Status: RESOLVED | Noted: 2025-01-24 | Resolved: 2025-01-27

## 2025-01-27 PROBLEM — Z86.79 HISTORY OF ATRIAL FIBRILLATION: Status: RESOLVED | Noted: 2025-01-24 | Resolved: 2025-01-27

## 2025-01-27 PROBLEM — Z86.79 HISTORY OF CONGESTIVE HEART FAILURE: Status: RESOLVED | Noted: 2025-01-24 | Resolved: 2025-01-27

## 2025-01-27 NOTE — HISTORY OF PRESENT ILLNESS
[FreeTextEntry1] : 84-year-old female with past medical history of atrial fibrillation, congestive heart failure, hypertension presents today for consultation for bilateral foot swelling.  She takes 60 mg of Lasix and 25 mg of spironolactone daily.  Prior to starting diuretics, swelling extended into both legs.  She denies leg claudication or rest pain.  In June she underwent bilateral arterial sono which showed biphasic flow without evidence of significant stenosis.  She denies any claudication or rest pain.  Denies history of DVT.  She was being seen by [facility] wound care for superficial excoriations in both legs and was being treated with Unna boots.  She currently has abrasions on both knees and bruising to her face and legs after she sustained a fall.

## 2025-01-27 NOTE — PHYSICAL EXAM
[2+] : left 2+ [0] : left 0 [de-identified] : alert and oriented in NAD [de-identified] : left foot with mild discoloration, few small ulcerations 2nd and 3rd toe from fall

## 2025-01-27 NOTE — PHYSICAL EXAM
[2+] : left 2+ [0] : left 0 [de-identified] : alert and oriented in NAD [de-identified] : left foot with mild discoloration, few small ulcerations 2nd and 3rd toe from fall

## 2025-01-27 NOTE — PLAN
[TextEntry] : 85yo F with hx of CHF, a-fib with bilateral foot edema.  No evidence of significant arterial insufficiency. No vascular intervention indicated. - rx provided for compreflex compression stockings - pneumatic pump - referral to [facility] pcp and cardiology to discuss diuretic dosages and for hx of CHF

## 2025-01-27 NOTE — HISTORY OF PRESENT ILLNESS
[FreeTextEntry1] : 84-year-old female with past medical history of atrial fibrillation, congestive heart failure, hypertension presents today for consultation for bilateral foot swelling.  She takes 60 mg of Lasix and 25 mg of spironolactone daily.  Prior to starting diuretics, swelling extended into both legs.  She denies leg claudication or rest pain.  In June she underwent bilateral arterial sono which showed biphasic flow without evidence of significant stenosis.  She denies any claudication or rest pain.  Denies history of DVT.  She was being seen by [facility] wound care for superficial excoriations in both legs and was being treated with Unna boots.  She currently has abrasions on both knees and bruising to her face and legs after she sustained a fall.

## 2025-02-04 ENCOUNTER — EMERGENCY (EMERGENCY)
Facility: HOSPITAL | Age: 85
LOS: 1 days | Discharge: HOME | End: 2025-02-04
Attending: STUDENT IN AN ORGANIZED HEALTH CARE EDUCATION/TRAINING PROGRAM | Admitting: STUDENT IN AN ORGANIZED HEALTH CARE EDUCATION/TRAINING PROGRAM
Payer: MEDICARE

## 2025-02-04 VITALS
RESPIRATION RATE: 18 BRPM | OXYGEN SATURATION: 92 % | TEMPERATURE: 98 F | HEART RATE: 106 BPM | DIASTOLIC BLOOD PRESSURE: 82 MMHG | HEIGHT: 63 IN | SYSTOLIC BLOOD PRESSURE: 126 MMHG | WEIGHT: 106.92 LBS

## 2025-02-04 LAB
ALBUMIN SERPL ELPH-MCNC: 3.8 G/DL — SIGNIFICANT CHANGE UP (ref 3.3–5)
ALP SERPL-CCNC: 138 U/L — HIGH (ref 40–120)
ALT FLD-CCNC: 64 U/L — SIGNIFICANT CHANGE UP (ref 12–78)
ANION GAP SERPL CALC-SCNC: 5 MMOL/L — SIGNIFICANT CHANGE UP (ref 5–17)
APTT BLD: 28.7 SEC — SIGNIFICANT CHANGE UP (ref 24.5–35.6)
AST SERPL-CCNC: 64 U/L — HIGH (ref 15–37)
BASOPHILS # BLD AUTO: 0.05 K/UL — SIGNIFICANT CHANGE UP (ref 0–0.2)
BASOPHILS NFR BLD AUTO: 0.5 % — SIGNIFICANT CHANGE UP (ref 0–2)
BILIRUB SERPL-MCNC: 1.1 MG/DL — SIGNIFICANT CHANGE UP (ref 0.2–1.2)
BUN SERPL-MCNC: 20 MG/DL — SIGNIFICANT CHANGE UP (ref 7–23)
CALCIUM SERPL-MCNC: 8.8 MG/DL — SIGNIFICANT CHANGE UP (ref 8.5–10.1)
CHLORIDE SERPL-SCNC: 99 MMOL/L — SIGNIFICANT CHANGE UP (ref 96–108)
CO2 SERPL-SCNC: 35 MMOL/L — HIGH (ref 22–31)
CREAT SERPL-MCNC: 0.76 MG/DL — SIGNIFICANT CHANGE UP (ref 0.5–1.3)
EGFR: 77 ML/MIN/1.73M2 — SIGNIFICANT CHANGE UP
EOSINOPHIL # BLD AUTO: 0.01 K/UL — SIGNIFICANT CHANGE UP (ref 0–0.5)
EOSINOPHIL NFR BLD AUTO: 0.1 % — SIGNIFICANT CHANGE UP (ref 0–6)
GLUCOSE SERPL-MCNC: 104 MG/DL — HIGH (ref 70–99)
HCT VFR BLD CALC: 40.2 % — SIGNIFICANT CHANGE UP (ref 34.5–45)
HGB BLD-MCNC: 13.4 G/DL — SIGNIFICANT CHANGE UP (ref 11.5–15.5)
IMM GRANULOCYTES NFR BLD AUTO: 0.4 % — SIGNIFICANT CHANGE UP (ref 0–0.9)
INR BLD: 0.99 RATIO — SIGNIFICANT CHANGE UP (ref 0.85–1.16)
LYMPHOCYTES # BLD AUTO: 0.73 K/UL — LOW (ref 1–3.3)
LYMPHOCYTES # BLD AUTO: 8 % — LOW (ref 13–44)
MCHC RBC-ENTMCNC: 33.3 G/DL — SIGNIFICANT CHANGE UP (ref 32–36)
MCHC RBC-ENTMCNC: 34.4 PG — HIGH (ref 27–34)
MCV RBC AUTO: 103.3 FL — HIGH (ref 80–100)
MONOCYTES # BLD AUTO: 0.91 K/UL — HIGH (ref 0–0.9)
MONOCYTES NFR BLD AUTO: 10 % — SIGNIFICANT CHANGE UP (ref 2–14)
NEUTROPHILS # BLD AUTO: 7.4 K/UL — SIGNIFICANT CHANGE UP (ref 1.8–7.4)
NEUTROPHILS NFR BLD AUTO: 81 % — HIGH (ref 43–77)
NRBC # BLD: 0 /100 WBCS — SIGNIFICANT CHANGE UP (ref 0–0)
NRBC BLD-RTO: 0 /100 WBCS — SIGNIFICANT CHANGE UP (ref 0–0)
NT-PROBNP SERPL-SCNC: 1083 PG/ML — HIGH (ref 0–450)
PLATELET # BLD AUTO: 242 K/UL — SIGNIFICANT CHANGE UP (ref 150–400)
POTASSIUM SERPL-MCNC: 4.5 MMOL/L — SIGNIFICANT CHANGE UP (ref 3.5–5.3)
POTASSIUM SERPL-SCNC: 4.5 MMOL/L — SIGNIFICANT CHANGE UP (ref 3.5–5.3)
PROT SERPL-MCNC: 6.9 G/DL — SIGNIFICANT CHANGE UP (ref 6–8.3)
PROTHROM AB SERPL-ACNC: 11.6 SEC — SIGNIFICANT CHANGE UP (ref 9.9–13.4)
RBC # BLD: 3.89 M/UL — SIGNIFICANT CHANGE UP (ref 3.8–5.2)
RBC # FLD: 13.9 % — SIGNIFICANT CHANGE UP (ref 10.3–14.5)
SODIUM SERPL-SCNC: 139 MMOL/L — SIGNIFICANT CHANGE UP (ref 135–145)
WBC # BLD: 9.14 K/UL — SIGNIFICANT CHANGE UP (ref 3.8–10.5)
WBC # FLD AUTO: 9.14 K/UL — SIGNIFICANT CHANGE UP (ref 3.8–10.5)

## 2025-02-04 PROCEDURE — 70450 CT HEAD/BRAIN W/O DYE: CPT | Mod: 26

## 2025-02-04 PROCEDURE — 73630 X-RAY EXAM OF FOOT: CPT | Mod: 26,RT

## 2025-02-04 PROCEDURE — 72131 CT LUMBAR SPINE W/O DYE: CPT | Mod: 26

## 2025-02-04 PROCEDURE — 71045 X-RAY EXAM CHEST 1 VIEW: CPT | Mod: 26

## 2025-02-04 PROCEDURE — 76376 3D RENDER W/INTRP POSTPROCES: CPT | Mod: 26

## 2025-02-04 PROCEDURE — 99285 EMERGENCY DEPT VISIT HI MDM: CPT | Mod: FS

## 2025-02-04 PROCEDURE — 72125 CT NECK SPINE W/O DYE: CPT | Mod: 26

## 2025-02-04 PROCEDURE — 93970 EXTREMITY STUDY: CPT | Mod: 26

## 2025-02-04 PROCEDURE — 73590 X-RAY EXAM OF LOWER LEG: CPT | Mod: 26,RT

## 2025-02-04 PROCEDURE — 73610 X-RAY EXAM OF ANKLE: CPT | Mod: 26,RT

## 2025-02-04 PROCEDURE — 72192 CT PELVIS W/O DYE: CPT | Mod: 26

## 2025-02-04 PROCEDURE — 93010 ELECTROCARDIOGRAM REPORT: CPT

## 2025-02-04 PROCEDURE — 73502 X-RAY EXAM HIP UNI 2-3 VIEWS: CPT | Mod: 26,RT

## 2025-02-04 RX ORDER — METOPROLOL SUCCINATE 25 MG
5 TABLET, EXTENDED RELEASE 24 HR ORAL ONCE
Refills: 0 | Status: COMPLETED | OUTPATIENT
Start: 2025-02-04 | End: 2025-02-04

## 2025-02-04 RX ADMIN — Medication 40 MILLIGRAM(S): at 20:44

## 2025-02-04 NOTE — ED PROVIDER NOTE - CLINICAL SUMMARY MEDICAL DECISION MAKING FREE TEXT BOX
patient is a 84 YOF with PMH of CHF, afib, not on AC and HTn who presents  to the ED for fall. patient states that she feels her legs are " not working well" and they give out especially on the R when she is walking. cannot bear weight without fall. patient has a walker at home, has had numerous falls of late. denies HA, neck pain, blurry vision, chest pain, abdominal anna, n/v, numbness or weakness, back pain.    General: well appearing, no acute distress, appropriate weight  HENT:  Head: normocephalic,   Ears: canal clear, TM intact with good light reflex  Eyes: EOMI, PERRLA, no nystagmus  Nose: atraumatic  Oropharynx: mucosa pink, moist, no lesions, uvula midline.  Neck: no lymphadenopathy. no midline CTL spine TTP.    Cardiac: heart RRR, no murmurs, rubs, gallops, pulses 2+ x4  Pulm: lungs CTA  GI: abdomen soft, nontender  Neuro: A&Ox3, CN2-12 intact, sensation intact, strength 5/5  Skin: warm, dry, no rash, laceration, abrasion. multiple contusions throughout the body including face.     will obtain labs, imaging. patient will likely need PT eval as likely unsafe discharge home given multiple falls.

## 2025-02-04 NOTE — ED ADULT NURSE NOTE - HOW OFTEN DO YOU HAVE A DRINK CONTAINING ALCOHOL?
bacteriemia  missed dialysis
Never

## 2025-02-04 NOTE — ED PROVIDER NOTE - OBJECTIVE STATEMENT
Patient is a 84-year-old female with past medical history of CHF on Lasix did not take any today hypertension A-fib not on any anticoagulant coming in for evaluation status post fall.  Patient states she fell January 2 in driveway.  Patient states today her leg was bothering her sat on her chair that was rolling and fell and was unable to get up.  Patient denies hitting head no LOC no chest pain or shortness of breath.

## 2025-02-04 NOTE — ED ADULT NURSE NOTE - NSFALLHARMRISKINTERV_ED_ALL_ED

## 2025-02-04 NOTE — ED PROVIDER NOTE - PATIENT PORTAL LINK FT
You can access the FollowMyHealth Patient Portal offered by [facility] by registering at the following website: [email or web address]. By joining [employer]’s FollowMyHealth portal, you will also be able to view your health information using other applications (apps) compatible with our system.

## 2025-02-04 NOTE — ED PROVIDER NOTE - NSFOLLOWUPINSTRUCTIONS_ED_ALL_ED_FT
New O2 due to CHF is a patient    Home with home care New O2 due to CHF is a patient    Home with home care      Fall Prevention    WHAT YOU NEED TO KNOW:    Fall prevention includes ways to make your home and other areas safer. It also includes ways you can move more carefully to prevent a fall. Health conditions that cause changes in your blood pressure, vision, or muscle strength and coordination may increase your risk for falls. Medicines may also increase your risk for falls if they make you dizzy, weak, or sleepy.     DISCHARGE INSTRUCTIONS:    Call 911 or have someone else call if:     You have fallen and are unconscious.      You have fallen and cannot move part of your body.    Contact your healthcare provider if:     You have fallen and have pain or a headache.      You have questions or concerns about your condition or care.    Fall prevention tips:     Stand or sit up slowly. This may help you keep your balance and prevent falls.      Use assistive devices as directed. Your healthcare provider may suggest that you use a cane or walker to help you keep your balance. You may need to have grab bars put in your bathroom near the toilet or in the shower.      Wear shoes that fit well and have soles that . Wear shoes both inside and outside. Use slippers with good . Do not wear shoes with high heels.      Wear a personal alarm. This is a device that allows you to call 911 if you fall and need help. Ask your healthcare provider for more information.      Stay active. Exercise can help strengthen your muscles and improve your balance. Your healthcare provider may recommend water aerobics or walking. He or she may also recommend physical therapy to improve your coordination. Never start an exercise program without talking to your healthcare provider first.       Manage your medical conditions. Keep all appointments with your healthcare providers. Visit your eye doctor as directed.    Home safety tips:     Add items to prevent falls in the bathroom. Put nonslip strips on your bath or shower floor to prevent you from slipping. Use a bath mat if you do not have carpet in the bathroom. This will prevent you from falling when you step out of the bath or shower. Use a shower seat so you do not need to stand while you shower. Sit on the toilet or a chair in your bathroom to dry yourself and put on clothing. This will prevent you from losing your balance from drying or dressing yourself while you are standing.       Keep paths clear. Remove books, shoes, and other objects from walkways and stairs. Place cords for telephones and lamps out of the way so that you do not need to walk over them. Tape them down if you cannot move them. Remove small rugs. If you cannot remove a rug, secure it with double-sided tape. This will prevent you from tripping.       Install bright lights in your home. Use night lights to help light paths to the bathroom or kitchen. Always turn on the light before you start walking.      Keep items you use often on shelves within reach. Do not use a step stool to help you reach an item.      Paint or place reflective tape on the edges of your stairs. This will help you see the stairs better.    Follow up with your healthcare provider as directed: Write down your questions so you remember to ask them during your visits.

## 2025-02-04 NOTE — ED ADULT NURSE NOTE - OBJECTIVE STATEMENT
a/ox4 patient came to ED bib daughter after slipping off of a chair this AM. Patient now states her legs/feet are hurting her more than usual with +swelling noted. Per daughter, patient stopped taking her water pill this week but began taking it again yesterday due to increased swelling. Denies hitting head. Pending further labs and radiology reports. No SOB. +numbness to b/l feet 2.2 neuropathy.

## 2025-02-04 NOTE — ED ADULT NURSE NOTE - NS ED NURSE DISCH DISPOSITION
Has a dilated cardiomyopathy by history  We did review recent echocardiogram which shows some improvement with his cardiac function with ejection fraction approximately 65%    Patient will continue surveillance with Cardiology and medication as prescribed Discharged

## 2025-02-04 NOTE — ED PROVIDER NOTE - PROGRESS NOTE DETAILS
no acute fractures on ct and xray likely old ankle fracture + left lateral soft tissue edema hematoma   daily medication ordered states was taken off metoprolol only takes lasix didn't take for last few days    PT consult ordered will stay over night pt given IV metoprolol will get morning cardio consult pt does not want to be admitted Yamilet ATTG Patient requesting home PT.  Patient taken off of oxygen satting .  I believe patient missed her home morning medication for Lopressor plan for giving such. Yamilet ATTG   Discussed with  and patient and family at bedside plan for discharge.  Patient CTs imaging reviewed patient becomes mildly hypoxic into the low 90s when walking plan for supplemental oxygen Received sign out regarding patient, will follow up with labs, imaging.   Yamilet ATTG Patient requesting home PT.  Patient taken off of oxygen satting .  I believe patient missed her home morning medication for Lopressor plan for giving such. [place] ATTG

## 2025-02-04 NOTE — ED ADULT NURSE REASSESSMENT NOTE - NSFALLHARMRISKINTERV_ED_ALL_ED
Assistance OOB with selected safe patient handling equipment if applicable/Communicate risk of Fall with Harm to all staff, patient, and family/Monitor gait and stability/Provide patient with walking aids/Provide visual cue: red socks, yellow wristband, yellow gown, etc/Reinforce activity limits and safety measures with patient and family/Bed in lowest position, wheels locked, appropriate side rails in place/Call bell, personal items and telephone in reach/Instruct patient to call for assistance before getting out of bed/chair/stretcher/Non-slip footwear applied when patient is off stretcher/[place] to call system/Physically safe environment - no spills, clutter or unnecessary equipment/Purposeful Proactive Rounding/Room/bathroom lighting operational, light cord in reach

## 2025-02-04 NOTE — ED PROVIDER NOTE - DISPOSITION TYPE
DISCHARGE Skyrizi Counseling: I discussed with the patient the risks of risankizumab-rzaa including but not limited to immunosuppression, and serious infections.  The patient understands that monitoring is required including a PPD at baseline and must alert us or the primary physician if symptoms of infection or other concerning signs are noted.

## 2025-02-04 NOTE — ED ADULT TRIAGE NOTE - BP NONINVASIVE SYSTOLIC (MM HG)
Last Lipid Panel WNL  ASCVD calculated 10 year risk -moderate to high   Continue Atorvastatin to 80mg daily   Counseled on dietary modifications  Counseled to exercise 150 minutes weekly as exercise raises HDL and lowers LDL    126

## 2025-02-05 VITALS
SYSTOLIC BLOOD PRESSURE: 138 MMHG | HEART RATE: 90 BPM | RESPIRATION RATE: 20 BRPM | DIASTOLIC BLOOD PRESSURE: 90 MMHG | TEMPERATURE: 98 F | OXYGEN SATURATION: 100 %

## 2025-02-05 PROCEDURE — 96374 THER/PROPH/DIAG INJ IV PUSH: CPT

## 2025-02-05 PROCEDURE — 93970 EXTREMITY STUDY: CPT

## 2025-02-05 PROCEDURE — 99285 EMERGENCY DEPT VISIT HI MDM: CPT | Mod: 25

## 2025-02-05 PROCEDURE — 70450 CT HEAD/BRAIN W/O DYE: CPT | Mod: MC

## 2025-02-05 PROCEDURE — 96376 TX/PRO/DX INJ SAME DRUG ADON: CPT

## 2025-02-05 PROCEDURE — 85730 THROMBOPLASTIN TIME PARTIAL: CPT

## 2025-02-05 PROCEDURE — 71045 X-RAY EXAM CHEST 1 VIEW: CPT | Mod: 26

## 2025-02-05 PROCEDURE — 96375 TX/PRO/DX INJ NEW DRUG ADDON: CPT

## 2025-02-05 PROCEDURE — 36415 COLL VENOUS BLD VENIPUNCTURE: CPT

## 2025-02-05 PROCEDURE — 85610 PROTHROMBIN TIME: CPT

## 2025-02-05 PROCEDURE — 80053 COMPREHEN METABOLIC PANEL: CPT

## 2025-02-05 PROCEDURE — 85025 COMPLETE CBC W/AUTO DIFF WBC: CPT

## 2025-02-05 PROCEDURE — 97162 PT EVAL MOD COMPLEX 30 MIN: CPT

## 2025-02-05 PROCEDURE — 93005 ELECTROCARDIOGRAM TRACING: CPT

## 2025-02-05 PROCEDURE — 73590 X-RAY EXAM OF LOWER LEG: CPT

## 2025-02-05 PROCEDURE — 83880 ASSAY OF NATRIURETIC PEPTIDE: CPT

## 2025-02-05 PROCEDURE — 71045 X-RAY EXAM CHEST 1 VIEW: CPT

## 2025-02-05 PROCEDURE — 72131 CT LUMBAR SPINE W/O DYE: CPT | Mod: MC

## 2025-02-05 PROCEDURE — 73502 X-RAY EXAM HIP UNI 2-3 VIEWS: CPT

## 2025-02-05 PROCEDURE — 76376 3D RENDER W/INTRP POSTPROCES: CPT

## 2025-02-05 PROCEDURE — 73630 X-RAY EXAM OF FOOT: CPT

## 2025-02-05 PROCEDURE — 73610 X-RAY EXAM OF ANKLE: CPT

## 2025-02-05 PROCEDURE — 72125 CT NECK SPINE W/O DYE: CPT | Mod: MC

## 2025-02-05 PROCEDURE — 72192 CT PELVIS W/O DYE: CPT | Mod: MC

## 2025-02-05 RX ORDER — METOPROLOL SUCCINATE 25 MG
5 TABLET, EXTENDED RELEASE 24 HR ORAL ONCE
Refills: 0 | Status: COMPLETED | OUTPATIENT
Start: 2025-02-05 | End: 2025-02-05

## 2025-02-05 RX ADMIN — Medication 5 MILLIGRAM(S): at 09:31

## 2025-02-05 RX ADMIN — Medication 20 MILLIGRAM(S): at 12:24

## 2025-02-05 NOTE — ED ADULT NURSE REASSESSMENT NOTE - NS ED NURSE REASSESS COMMENT FT1
Patient refused to wear hospital gown
pt refused a hospital gown. ANM made aware.
Pt received bed alert and oriented and resting in bed. Meds given and tolerated well. Pt now being evaluated by SW and CM. Nursing care ongoing and safety maintained.
received pt in rm 19b, aox4, resting comfortably in stretcher, no complaints of pain @ this time. iv lock 20 g noted to rac, patent and flushing. no s/s redness, swelling or infiltration. bp 171/103, pt asymptomatic. denies headache, blurry vision. pt states, "my bp usually goes up when im in the hospital , I get nervous." pulse ox 99% on RA. denies sob, difficulty breathing. pending ct scan and pt eval.

## 2025-02-05 NOTE — CARE COORDINATION ASSESSMENT. - NS SW HOME EQUIPMENT
Patient states she has a cane at home that she will use occasionally.  Has a walker and wheelchair but does not use/cane/walker/wheelchair

## 2025-02-05 NOTE — PHYSICAL THERAPY INITIAL EVALUATION ADULT - ADDITIONAL COMMENTS
pt. seen in ED, Pt. AxOx4. Pt. says she lives alone in a private house with no JAMIA with a total of 12 steps within the house. Pt. reports she has 2 daughters who live close by who visit her often. Pt. says she has 2 bathrooms one that is a walk in shower and another which is a tub shower. Pt. says she uses a cane for ambulation. Pt. says she has a shower chair that she has not used. Pt. says she doesn't drive anymore. pt. seen in ED, Pt. AxOx4. Pt. says she lives alone in a private house with no JAMIA with a total of 12 steps within the house. Pt. reports she has 2 daughters who live close by who visit her often. Pt. says she has 2 bathrooms one that is a walk in shower and another which is a tub shower. Pt. says she uses a cane for ambulation. Pt. says she has a shower chair that she has not used. Pt. says she is a community ambulator and doesn't drive anymore.

## 2025-02-05 NOTE — POST DISCHARGE NOTE - NOTIFICATION:
SREEDHAR Guerrero from [facility] reviewed peervue discrepancy regarding XR of right foot. with suspected fx of second third and 5th metatarsal bones. spoke with patient's daughter, this is possibly old from fall 1 month ago. She will follow-up with the podiatrist, recommended non-weight bearing (or as tolerated) until seen. she is not complaining of painto the foot currently

## 2025-02-05 NOTE — CARE COORDINATION ASSESSMENT. - NSCAREPROVIDERS_GEN_ALL_CORE_FT
CARE PROVIDERS:  Administration: Juan Isaac  Administration: Seth Ruiz  Administration: Bernadette Mosley  Administration: Celestine Lin  Admitting: Doctor, Unknown  Attending: Doctor, Unknown  Case Management: Erik Sanchez  ED ACP: Petr Carranza  ED Attending: Anthony Martinez ED Nurse: Juwan Gutierrez  Emergency Medicine: Renetta Anthony  Emergency Medicine: Angel Shirley  Ordered: ServiceAccount, SCMMLM  Ordered: ServiceAccount, SCMMLM  Ordered: ServiceAccount, SCMMLM  Ordered: ServiceAccount, SCMMLM  Ordered: ServiceAccount, SCMMLM  Outpatient Provider: Lani Villegas  PCA/Nursing Assistant: Latisha Arzola  PCA/Nursing Assistant: Keyur Suarez  PCA/Nursing Assistant: Estefania Jackson  Physical Therapy: Renu Eckert  Primary Team: Petr Carranza  Primary Team: Keyur Woodard  : Alexus Cortez// Supp. Assoc.: Cheo Thompson

## 2025-02-05 NOTE — CARE COORDINATION ASSESSMENT. - OTHER PERTINENT DISCHARGE PLANNING INFORMATION:
Met with patient at bedside to discuss the role of case management with verbalized understanding.  Patient seen in ED with multiple falls, was seen by physical therapy and recommended for MAL.  Patient is currently declining MAL and wishes to return home.  Patient resides home alone, has DME but uses sparingly.  States she has visiting nurse for wound care, but unable to provide further details.  Message left with daughter Gilda to discuss transition planning [phone].  Will remain available.  PCP Dr Villegas

## 2025-02-05 NOTE — SOCIAL WORK PROGRESS NOTE - NSSWPROGRESSNOTE_GEN_ALL_CORE
SW and CM met with Pt at Bedside to discuss transition planning. Pt was recommended for MAL by PT but the patient would like to return home. Home PT VS MAL was explained to the patient and she expressed verbal understanding, but pt has had multiple falls in the last 4 weeks. Pt gave permission for SW/CM to speak with DTR and message was left with her to discuss recommendations. OH/CM currently awaiting a call back. Sw made available for any further needs.

## 2025-02-05 NOTE — CAREGIVER ENGAGEMENT NOTE - CAREGIVER OUTREACH NOTES - FREE TEXT
Message left for daughter Gilda as requested by patient to discuss transition planning.  Will attempt later

## 2025-02-05 NOTE — CASE MANAGEMENT PROGRESS NOTE - NSCMPROGRESSNOTE_GEN_ALL_CORE
Spoke with daughter Kathleen on phone and then bo Castañeda at bedside regarding transition planning.  Both are agreeable for patient to return home when D/C ready.  Patient noted to have desaturation on room air and does have hx CHF.  Will pursue home o2 for patient.  Referral to be sent to [facility].  Patient states she has hx of [facility] at Home and referral will be sent for [facility] services.  Bo Castañeda will transport home when D/C ready after POC delivered.  Will remain availabe.

## 2025-02-05 NOTE — PHYSICAL THERAPY INITIAL EVALUATION ADULT - PERTINENT HX OF CURRENT PROBLEM, REHAB EVAL
84-year-old female with past medical history of CHF on Lasix did not take any today hypertension A-fib not on any anticoagulant coming in for evaluation status post fall.  Patient states she fell January 2 in driveway.  Patient states today her leg was bothering her sat on her chair that was rolling and fell and was unable to get up.  Patient denies hitting head no LOC no chest pain or shortness of breath.

## 2025-02-05 NOTE — PHYSICAL THERAPY INITIAL EVALUATION ADULT - NSACTIVITYREC_GEN_A_PT
Pt. received in bed in supine position. Pt. AxOx4 and able to tolerate physical therapy evaluation. Pt. requires contact guard assist with bed mobility. Pt. requires Min Assist1 for sit to stand and stand to sit transfers. Pt. BP obtained supine before activity in supine: 150/107, HR: 107, 2LO2 NC: 98%. Pt. taken off O2 for activity as pt. stated she does normally need oxygen. During activity, pt. O2 on RA 90% with HR fluctuating between high 130's and low 140's. Pt. returned to bed in supine position 2L O2 re-administered and O2% back to 96% and BP obtained: 146/100. Physical therapy recommendation to subacute rehab, however pt. wants to go home and says she could get an aide if she needs too. Pt. left in bed in supine position, all lines intact, NAD, tray table in reachable distance. RN, Juwan aware. Pt. received in bed in supine position. Pt. AxOx4 and able to tolerate physical therapy evaluation. Pt. requires contact guard assist with bed mobility. Pt. requires Min Assist1 for sit to stand and stand to sit transfers. Pt. BP obtained supine before activity in supine: 150/107, HR: 107, 2LO2 NC: 98%. Pt. taken off O2 for activity as pt. stated she does not normally need oxygen. During activity, pt. O2 on RA 90% with HR fluctuating between high 130's and low 140's. Therefore further ambulation was withheld. Pt. returned to bed in supine position 2L O2 re-administered and O2% back to 96% and BP obtained: 146/100. Physical therapy recommendation to subacute rehab, however pt. wants to go home and says she could get an aide if she needs too. If pt is d/c home then recommend HPT w/ assist at home. Pt. left in bed in supine position, all lines intact, NAD, tray table in reachable distance. RN, Tecjanae aware.

## 2025-02-11 ENCOUNTER — INPATIENT (INPATIENT)
Facility: HOSPITAL | Age: 85
LOS: 2 days | Discharge: HOME | DRG: 93 | End: 2025-02-14
Attending: INTERNAL MEDICINE | Admitting: INTERNAL MEDICINE
Payer: MEDICARE

## 2025-02-11 VITALS
HEART RATE: 99 BPM | TEMPERATURE: 97 F | DIASTOLIC BLOOD PRESSURE: 66 MMHG | HEIGHT: 63 IN | RESPIRATION RATE: 16 BRPM | SYSTOLIC BLOOD PRESSURE: 128 MMHG | WEIGHT: 110.01 LBS | OXYGEN SATURATION: 99 %

## 2025-02-11 DIAGNOSIS — S92.901A UNSPECIFIED FRACTURE OF RIGHT FOOT, INITIAL ENCOUNTER FOR CLOSED FRACTURE: ICD-10-CM

## 2025-02-11 DIAGNOSIS — R29.6 REPEATED FALLS: ICD-10-CM

## 2025-02-11 DIAGNOSIS — I48.91 UNSPECIFIED ATRIAL FIBRILLATION: ICD-10-CM

## 2025-02-11 DIAGNOSIS — R26.89 OTHER ABNORMALITIES OF GAIT AND MOBILITY: ICD-10-CM

## 2025-02-11 DIAGNOSIS — I50.9 HEART FAILURE, UNSPECIFIED: ICD-10-CM

## 2025-02-11 LAB
ALBUMIN SERPL ELPH-MCNC: 3 G/DL — LOW (ref 3.3–5)
ALP SERPL-CCNC: 130 U/L — HIGH (ref 40–120)
ALT FLD-CCNC: 30 U/L — SIGNIFICANT CHANGE UP (ref 12–78)
ANION GAP SERPL CALC-SCNC: 4 MMOL/L — LOW (ref 5–17)
AST SERPL-CCNC: 24 U/L — SIGNIFICANT CHANGE UP (ref 15–37)
BILIRUB SERPL-MCNC: 1.5 MG/DL — HIGH (ref 0.2–1.2)
BUN SERPL-MCNC: 24 MG/DL — HIGH (ref 7–23)
CALCIUM SERPL-MCNC: 8.2 MG/DL — LOW (ref 8.5–10.1)
CHLORIDE SERPL-SCNC: 98 MMOL/L — SIGNIFICANT CHANGE UP (ref 96–108)
CO2 SERPL-SCNC: 37 MMOL/L — HIGH (ref 22–31)
CREAT SERPL-MCNC: 0.97 MG/DL — SIGNIFICANT CHANGE UP (ref 0.5–1.3)
EGFR: 58 ML/MIN/1.73M2 — LOW
GLUCOSE SERPL-MCNC: 159 MG/DL — HIGH (ref 70–99)
HCT VFR BLD CALC: 40 % — SIGNIFICANT CHANGE UP (ref 34.5–45)
HGB BLD-MCNC: 13.2 G/DL — SIGNIFICANT CHANGE UP (ref 11.5–15.5)
MAGNESIUM SERPL-MCNC: 2.2 MG/DL — SIGNIFICANT CHANGE UP (ref 1.6–2.6)
MCHC RBC-ENTMCNC: 33 G/DL — SIGNIFICANT CHANGE UP (ref 32–36)
MCHC RBC-ENTMCNC: 34 PG — SIGNIFICANT CHANGE UP (ref 27–34)
MCV RBC AUTO: 103.1 FL — HIGH (ref 80–100)
NRBC BLD AUTO-RTO: 0 /100 WBCS — SIGNIFICANT CHANGE UP (ref 0–0)
PLATELET # BLD AUTO: 284 K/UL — SIGNIFICANT CHANGE UP (ref 150–400)
POTASSIUM SERPL-MCNC: 3.4 MMOL/L — LOW (ref 3.5–5.3)
POTASSIUM SERPL-SCNC: 3.4 MMOL/L — LOW (ref 3.5–5.3)
PROT SERPL-MCNC: 6 G/DL — SIGNIFICANT CHANGE UP (ref 6–8.3)
RBC # BLD: 3.88 M/UL — SIGNIFICANT CHANGE UP (ref 3.8–5.2)
RBC # FLD: 14.1 % — SIGNIFICANT CHANGE UP (ref 10.3–14.5)
SODIUM SERPL-SCNC: 139 MMOL/L — SIGNIFICANT CHANGE UP (ref 135–145)
WBC # BLD: 9.9 K/UL — SIGNIFICANT CHANGE UP (ref 3.8–10.5)
WBC # FLD AUTO: 9.9 K/UL — SIGNIFICANT CHANGE UP (ref 3.8–10.5)

## 2025-02-11 PROCEDURE — 99285 EMERGENCY DEPT VISIT HI MDM: CPT | Mod: FS

## 2025-02-11 PROCEDURE — 71045 X-RAY EXAM CHEST 1 VIEW: CPT | Mod: 26

## 2025-02-11 RX ORDER — ACETAMINOPHEN, DIPHENHYDRAMINE HCL, PHENYLEPHRINE HCL 325; 25; 5 MG/1; MG/1; MG/1
3 TABLET ORAL AT BEDTIME
Refills: 0 | Status: DISCONTINUED | OUTPATIENT
Start: 2025-02-11 | End: 2025-02-14

## 2025-02-11 RX ORDER — ENOXAPARIN SODIUM 100 MG/ML
40 INJECTION SUBCUTANEOUS EVERY 24 HOURS
Refills: 0 | Status: DISCONTINUED | OUTPATIENT
Start: 2025-02-12 | End: 2025-02-14

## 2025-02-11 RX ORDER — ACETAMINOPHEN 160 MG/5ML
650 SUSPENSION ORAL EVERY 6 HOURS
Refills: 0 | Status: DISCONTINUED | OUTPATIENT
Start: 2025-02-11 | End: 2025-02-14

## 2025-02-11 RX ORDER — MAGNESIUM, ALUMINUM HYDROXIDE 200-225/5
30 SUSPENSION, ORAL (FINAL DOSE FORM) ORAL EVERY 4 HOURS
Refills: 0 | Status: DISCONTINUED | OUTPATIENT
Start: 2025-02-11 | End: 2025-02-14

## 2025-02-11 RX ORDER — ONDANSETRON 4 MG/1
4 TABLET, ORALLY DISINTEGRATING ORAL EVERY 6 HOURS
Refills: 0 | Status: DISCONTINUED | OUTPATIENT
Start: 2025-02-11 | End: 2025-02-14

## 2025-02-11 NOTE — H&P ADULT - HISTORY OF PRESENT ILLNESS
This is an 83yo F PMHx HTN, CHF, A-fib not on AC presents to ED for evaluation for possible MAL placement.  Patient was seen in ED 2/4 s/p mechanical fall, all imaging was negative for acute injuries and had physical therapy evaluation that recommended subacute rehab however patient and family elected to be discharged home.  Patient now returning stating she is having difficulty ambulating at home despite cane and walker and would like to go to MAL.  No new injuries or trauma since last ED visit.  X-ray from ED visit showed right second, third and fifth metatarsal fracture which is likely old from fall in early January.  Patient denies any right foot pain.  Denies fever, chills, CP, SOB, fall, trauma, back pain, bowel/bladder incontinence. 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

## 2025-02-11 NOTE — ED ADULT NURSE NOTE - NSFALLHARMRISKINTERV_ED_ALL_ED

## 2025-02-11 NOTE — PHYSICAL THERAPY INITIAL EVALUATION ADULT - PERTINENT HX OF CURRENT PROBLEM, REHAB EVAL
85yo F PMHx HTN, CHF, A-fib not on AC presents to ED for evaluation for possible MAL placement.  Patient was seen in ED 2/4 s/p mechanical fall, all imaging was negative for acute injuries and had physical therapy evaluation that recommended subacute rehab however patient and family elected to be discharged home.  Patient now returning stating she is having difficulty ambulating at home despite cane and walker and would like to go to MAL.  No new injuries or trauma since last ED visit.  X-ray from ED visit showed right second, third and fifth metatarsal fracture which is likely old from fall in early January.  Patient denies any right foot pain.  Denies fever, chills, CP, SOB, fall, trauma, back pain, bowel/bladder incontinence. 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

## 2025-02-11 NOTE — ED PROVIDER NOTE - IV ALTEPLASE EXCL ABS HIDDEN
Follow Up Visit     Cliff Peoples returns today for follow up visit regarding bilateral knee osteoarthritis. Treatment thus far has included steroid injections with good relief, last cortisone injection on 7/2020, continue to work on strengthening and take anti-inflammatories as needed, as well weight loss. She reports symptoms are unchanged, requesting cortisone injection today bilateral knee. REVIEW OF SYSTEMS:     General: Negative Fever, chills, fatigue   Cardiovascular: Negative chest pain, palpitations  Respiratory: Equal chest expansion, negative shortness of breath   Skin: Negative rash, open wounds  Psych: Normal affect, mood stable  Neurologic: sensation grossly intact in extremities   Musculoskeletal: See HPI      Physical Exam:     Height: 5' 5\" (1.651 m), Weight: 260 lb (117.9 kg) (per pt)    General: Alert and oriented x3, no acute distress  Cardiovascular/pulmonary: No labored breathing, peripheral perfusion intact  Musculoskeletal:    Bilateral knee exam range of motion 0-90 with stiffness present. Passive range of motion was equivocal.  Joint line tenderness is present on palpation. No palpable effusion present. No abnormal swelling or deformity visualized. Valgus varus exams are stable. Stable knees on exam.    Controlled Substances Monitoring:      Imaging:  X-rays of both knees were reviewed today which shows severe degenerative changes consistent with osteoarthritis. Was obtained today. Procedure Note: Knee Cortisone injection     The bilateral knees were identified as the injection site. The risk and benefits of a cortisone injection were explained and the patient consented to the injection. Under sterile conditions, each knee was injected with a mixture of 40 mg of Kenalog and 4 mL of 1% Lidocaine without complication. A sterile bandage was applied.     Administrations This Visit     lidocaine 1 % injection 4 mL     Admin Date  03/10/2021 Action  Given Dose  4 mL Route  Intra-articular Administered By  Poli Munoz RN    Admin Date  03/10/2021 Action  Given Dose  4 mL Route  Intra-articular Administered By  Poli Munoz RN          triamcinolone acetonide [facility]) injection 40 mg     Admin Date  03/10/2021 Action  Given Dose  40 mg Route  Intra-articular Administered By  Poli Munoz RN    Admin Date  03/10/2021 Action  Given Dose  40 mg Route  Intra-articular Administered By  Poli Munoz RN                Assessment: Lateral knee osteoarthritis      Plan: Today we discussed both her knees. Previous injections of July 2020 provided long-term relief of patient's symptoms in both her knees. She reports that over the past 3 weeks she has noticed a gradual return of her symptoms to baseline. We discussed continuing conservative treatment today including repeat cortisone injections. This was agreed to with the patient and provided. She will follow-up as needed when symptoms return.       Abram Handley  Orthopedic Surgery   03/10/21  1:08 PM show alert and awake/follows commands

## 2025-02-11 NOTE — ED PROVIDER NOTE - OBJECTIVE STATEMENT
83yo F PMHx HTN, CHF, A-fib not on AC presents to ED for evaluation for possible MAL placement.  Patient was seen in ED 2/4 s/p mechanical fall, all imaging was negative for acute injuries and had physical therapy evaluation that recommended subacute rehab however patient and family elected to be discharged home.  Patient now returning stating she is having difficulty ambulating at home despite cane and walker and would like to go to MAL.  No new injuries or trauma since last ED visit.  X-ray from ED visit showed right second, third and fifth metatarsal fracture which is likely old from fall in early January.  Patient denies any right foot pain.  Denies fever, chills, CP, SOB, fall, trauma, back pain, bowel/bladder incontinence. 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

## 2025-02-11 NOTE — CARE COORDINATION ASSESSMENT. - OTHER PERTINENT REFERRAL INFORMATION
Sw met with Pt and DTr at bedside. Pt is A &  Ox4 and able to make her needs known. Sw introduced self and role and both expressed verbal understanding. Pt Was seen in ED in 2/4 and was recommended for MAL but declined but know returned requesting MAL. Pt lives in a pvt home alone and has 0 JAMIA but 4, landing that 7 inside. Pt has a cane/walker at home and was Indep with her ADLS PTA but has been having multiple falls. Pt has no hx of MAL. PCP. saadia Garibay [phone]

## 2025-02-11 NOTE — PHYSICAL THERAPY INITIAL EVALUATION ADULT - TRANSFER TRAINING, PT EVAL
.
Patient will transfer independently from all surfaces in 2 weeks in order to increase mobility at home

## 2025-02-11 NOTE — CONSULT NOTE ADULT - PROBLEM SELECTOR RECOMMENDATION 9
Chart reviewed and Patient evaluated  Discussed diagnosis and treatment with patient  Xrays right foot reviewed, noted hx of  2nd, 3rd, and 5th metatarsal head fractures,   Xrays right ankle reveal Medial malleolus inferior tip of displaced fracture.  Due to pt history of falls, recommend PT consult for possible MAL placement  Rec wb as tolerated with use of walker  No podiatric intervention at this time  Will discuss with all attendings  Thank you for consult

## 2025-02-11 NOTE — ED PROVIDER NOTE - PHYSICAL EXAMINATION
Gen: No acute distress, non toxic  Head: NCAT  Eyes: pink conjunctivae, EOMI, PERRL  CV: RRR, nl s1/s2.  Resp: CTAB, normal rate and effort  GI: Abdomen soft, NT, ND. No rebound, no guarding  Neuro: A&O x 3, sensorimotor intact without deficits, decreased sensation to toes b/l feet 2/2 neuropathy.  MSK: No spine or joint tenderness to palpation, Full ROM ext x 4  Skin: No rashes. intact and perfused.

## 2025-02-11 NOTE — PATIENT PROFILE ADULT - FALL HARM RISK - HARM RISK INTERVENTIONS

## 2025-02-11 NOTE — PHYSICAL THERAPY INITIAL EVALUATION ADULT - GAIT TRAINING, PT EVAL
Patient will ambulate x 150 feet independently with RW in 2 weeks in order to increase mobility at home

## 2025-02-11 NOTE — PATIENT PROFILE ADULT - FUNCTIONAL ASSESSMENT - DAILY ACTIVITY 5.
Please follow up with Dr. Fang in 2 months.  Please schedule labs in [place] in 1 month and then come 15-30 minutes prior to follow up for more labs.     Labs today!    Lab Date/Time:    Follow Up Date/Time:     If you have any questions or concerns please feel free to call.    If you need to reschedule please call:  Clinic or Lab Appointment - [phone]  Infusion - [phone]  Imaging - [phone]    Joel Monique RN, BSN, OCN   Oncology Care Coordinator RN  [facility]  [phone]       3 = A little assistance

## 2025-02-11 NOTE — ED PROVIDER NOTE - ATTENDING APP SHARED VISIT CONTRIBUTION OF CARE
84-year-old female presents with daughter complaining of inability to ambulate secondary to multiple metatarsal fractures in her right foot sustained from a fall on February 4.  Patient had extensive workup on February 4 with fractures of the foot identified.  Patient denies headache neck pain back pain chest pain palpitation shortness of breath cough abdominal pain nausea vomiting diarrhea numbness tingling pins-and-needles weakness bladder or bowel changes.  Patient denies fever or chills. Patient and daughters state PMD agrees patient requires acute rehab.   Gen: Awake, Alert, WD, WN, NAD  Head:  NC/AT  Eyes:  PERRL, EOMI, Conjunctiva pink, lids normal, no scleral icterus  ENT: OP clear, moist mucus membranes  Neck: supple, nontender, trachea midline  Cardiac/CV:  S1 S2, RRR, no M/G/R  Chest: nontender, no crepitus  Respiratory/Pulm:  CTAB, good air movement, normal resp effort  Gastrointestinal/Abdomen:  Soft, nontender, nondistended  Pelvis: stable, nontender, Hips: FROM, nontender  Back:  no CVAT, no MLT  Ext:  warm, well perfused, moving all extremities spontaneously, no cyanosis, no erythema, no edema, distal pulses intact  Skin: intact, +healing abrasions left ant knee.  no rash, no vesicles, no petechiae, no ecchymosis  Neuro:  AAOx3, sensation intact, motor 5/5 x 4 extremities, gait unable, speech clear   labs, CXR, EKG, PT consult, SW consult, Multiple foot fractures, inability to ambulate,

## 2025-02-11 NOTE — ED ADULT NURSE REASSESSMENT NOTE - NS ED NURSE REASSESS COMMENT FT1
received pt in t3, resting comfortably in stretcher, no complaints of pain/discomfort @ this time. iv lock 20 g noted to L forearm, patent and flushing. no s/s redness, swelling or infiltration. report given to katherine yeager. pt ready for transport in stable condition.

## 2025-02-11 NOTE — PATIENT CHOICE NOTE. - NSPTCHOICESTATE_GEN_ALL_CORE

## 2025-02-11 NOTE — CARE COORDINATION ASSESSMENT. - NSCAREPROVIDERS_GEN_ALL_CORE_FT
CARE PROVIDERS:  Accepting Physician: Taran Jackman  Access Services: Giselle Irizarry  Administration: Zeb Barron  Administration: Kings Corral  Admitting: Taran Jackman  Attending: Taran Jackman  Consultant: Luisito Pratt  Covering Team: Taran Jackman  ED ACP: Terence Macias  ED Attending: Rob Arthur  ED Nurse: Matt Jain  Outpatient Provider: Lani Villegas  Physical Therapy: Lionel Palma  Primary Team: Petr Carranza  Primary Team: Terence Macias  : Alexus Cortez// Supp. Assoc.: Beth Cespedes

## 2025-02-11 NOTE — PATIENT PROFILE ADULT - NSPROHMSYMPCOND_GEN_A_NUR
I left a detailed message for the patient's mother to call back and schedule a 30 minute pre-op appointment with Ayden GALLO about 2 weeks prior to his surgery on 3/8/23.  
No labs needed. Please schedule patient prior to 3/8/2023
Patient requires medical clearance prior to [place] with [place]. Gran[place] [facility] [place]   Planned Procedure: Bilateral myringotomy with ear tube insertions             Date scheduled: 3-8-23   Patient's twin has an appt with Ayden on 2-10-23, so mother will be in the office, if you have anything available to see him for preop at the same time.    Left msg for mom to return my call. I do not see an appt for his twin on 2/10/23 as stated above.    Place orders needed  
cardiovascular

## 2025-02-11 NOTE — ED PROVIDER NOTE - IV ALTEPLASE EXCL REL HIDDEN
[facility] sent over phone. Faxed clinicals to [facility]   [phone]. Status call [phone]. req # [id number]. Faxed notes Status is pending.
show

## 2025-02-12 LAB
ANION GAP SERPL CALC-SCNC: 5 MMOL/L — SIGNIFICANT CHANGE UP (ref 5–17)
BUN SERPL-MCNC: 23 MG/DL — SIGNIFICANT CHANGE UP (ref 7–23)
CALCIUM SERPL-MCNC: 8.5 MG/DL — SIGNIFICANT CHANGE UP (ref 8.5–10.1)
CHLORIDE SERPL-SCNC: 98 MMOL/L — SIGNIFICANT CHANGE UP (ref 96–108)
CO2 SERPL-SCNC: 38 MMOL/L — HIGH (ref 22–31)
CREAT SERPL-MCNC: 0.78 MG/DL — SIGNIFICANT CHANGE UP (ref 0.5–1.3)
EGFR: 75 ML/MIN/1.73M2 — SIGNIFICANT CHANGE UP
GLUCOSE SERPL-MCNC: 143 MG/DL — HIGH (ref 70–99)
HCT VFR BLD CALC: 42.6 % — SIGNIFICANT CHANGE UP (ref 34.5–45)
HGB BLD-MCNC: 14.2 G/DL — SIGNIFICANT CHANGE UP (ref 11.5–15.5)
MAGNESIUM SERPL-MCNC: 2.2 MG/DL — SIGNIFICANT CHANGE UP (ref 1.6–2.6)
MCHC RBC-ENTMCNC: 33.3 G/DL — SIGNIFICANT CHANGE UP (ref 32–36)
MCHC RBC-ENTMCNC: 34.3 PG — HIGH (ref 27–34)
MCV RBC AUTO: 102.9 FL — HIGH (ref 80–100)
NRBC BLD AUTO-RTO: 0 /100 WBCS — SIGNIFICANT CHANGE UP (ref 0–0)
PLATELET # BLD AUTO: 263 K/UL — SIGNIFICANT CHANGE UP (ref 150–400)
POTASSIUM SERPL-MCNC: 3.3 MMOL/L — LOW (ref 3.5–5.3)
POTASSIUM SERPL-SCNC: 3.3 MMOL/L — LOW (ref 3.5–5.3)
RBC # BLD: 4.14 M/UL — SIGNIFICANT CHANGE UP (ref 3.8–5.2)
RBC # FLD: 13.8 % — SIGNIFICANT CHANGE UP (ref 10.3–14.5)
SODIUM SERPL-SCNC: 141 MMOL/L — SIGNIFICANT CHANGE UP (ref 135–145)
WBC # BLD: 6.53 K/UL — SIGNIFICANT CHANGE UP (ref 3.8–10.5)
WBC # FLD AUTO: 6.53 K/UL — SIGNIFICANT CHANGE UP (ref 3.8–10.5)

## 2025-02-12 PROCEDURE — 93010 ELECTROCARDIOGRAM REPORT: CPT

## 2025-02-12 RX ORDER — POTASSIUM CHLORIDE 750 MG/1
40 TABLET, EXTENDED RELEASE ORAL ONCE
Refills: 0 | Status: COMPLETED | OUTPATIENT
Start: 2025-02-12 | End: 2025-02-13

## 2025-02-12 RX ADMIN — Medication 25 MILLIGRAM(S): at 05:34

## 2025-02-12 RX ADMIN — Medication 40 MILLIGRAM(S): at 05:34

## 2025-02-12 NOTE — CONSULT NOTE ADULT - ASSESSMENT
The patient is an 84 year old female with a history of HTN, chronic diastolic heart failure, atrial fibrillation who presents with a fall.    Plan:  - ECG with atrial fibrillation and LVH related abnormalities  - As per outpatient notes, she is not on anticoagulation for atrial fibrillation due to significant bleeding from a leg wound and for falls  - Outpatient echo 3/24 with normal LV systolic function, mild MS, severe MR, severe TR, mild pulm HTN  - Check orthostatics  - Continue furosemide 40 mg daily  - Continue spironolactone 25 mg daily  - May benefit from outpatient structural heart and EP evals (may benefit from mitral valve intervention and LAAO device implantation)  - PT
Right foot fracture

## 2025-02-12 NOTE — SOCIAL WORK PROGRESS NOTE - NSSWPROGRESSNOTE_GEN_ALL_CORE
Pt accepted to [facility] for subacute rehab pending three night Medicare stay. SW will continue to follow.

## 2025-02-12 NOTE — CONSULT NOTE ADULT - SUBJECTIVE AND OBJECTIVE BOX
History of Present Illness: The patient is an 84 year old female with a history of HTN, chronic diastolic heart failure, atrial fibrillation who presents with a fall. She has had multiple recent falls. She states she has had balance issues while standing. She denies lightheadedness. No chest pain or shortness of breath. She states she was taken off of anticoagulation in the past; unsure if due to bleeding issues.    Past Medical/Surgical History:  HTN, chronic diastolic heart failure, atrial fibrillation    Medications:  Home Medications:  Lasix 20 mg oral tablet: 1 tab(s) orally once a day (11 Feb 2025 15:09)  Lasix 40 mg oral tablet: 1 tab(s) orally once a day (11 Feb 2025 15:09)  spironolactone 25 mg oral tablet: 1 tab(s) orally once a day (11 Feb 2025 15:09)      Family History: Non-contributory family history of premature cardiovascular atherosclerotic disease    Social History: No tobacco, alcohol or drug use    Review of Systems:  General: No fevers, chills, weight gain  Skin: No rashes, color changes  Cardiovascular: No chest pain, orthopnea  Respiratory: No shortness of breath, cough  Gastrointestinal: No nausea, abdominal pain  Genitourinary: No incontinence, pain with urination  Musculoskeletal: No pain, swelling, decreased range of motion  Neurological: No headache, weakness  Psychiatric: No depression, anxiety  Endocrine: No weight gain, increased thirst  All other systems are comprehensively negative.    Physical Exam:  Vitals:        Vital Signs Last 24 Hrs  T(C): 36.9 (12 Feb 2025 05:15), Max: 36.9 (12 Feb 2025 05:15)  T(F): 98.4 (12 Feb 2025 05:15), Max: 98.4 (12 Feb 2025 05:15)  HR: 80 (12 Feb 2025 05:15) (80 - 99)  BP: 157/62 (12 Feb 2025 05:15) (124/75 - 157/62)  BP(mean): --  RR: 18 (12 Feb 2025 05:15) (16 - 18)  SpO2: 96% (12 Feb 2025 05:15) (92% - 99%)    Parameters below as of 12 Feb 2025 05:15  Patient On (Oxygen Delivery Method): room air      General: NAD  HEENT: MMM  Neck: No JVD, no carotid bruit  Lungs: CTAB  CV: RRR, nl S1/S2, no M/R/G  Abdomen: S/NT/ND, +BS  Extremities: No LE edema, no cyanosis  Neuro: AAOx3, non-focal  Skin: No rash    Labs:                        14.2   6.53  )-----------( 263      ( 12 Feb 2025 09:25 )             42.6     02-11    139  |  98  |  24[H]  ----------------------------<  159[H]  3.4[L]   |  37[H]  |  0.97    Ca    8.2[L]      11 Feb 2025 15:17  Mg     2.2     02-11    TPro  6.0  /  Alb  3.0[L]  /  TBili  1.5[H]  /  DBili  x   /  AST  24  /  ALT  30  /  AlkPhos  130[H]  02-11            ECG/Telemetry: Atrial fibrillation, normal axis, LVH, nonspecific ST abnormality    
S : 84y year old Female seen at bedside for hx of fall.  Pt was seen 2/4 and results were negative for acute injuries. Patient states she sustained an injury 15 years ago to her right foot, but does not remember the extent of her injuries.  Daughter and patient state patient has also sprained her ankle several times in the past, but no trauma to area recently.  Patient states she has been able ambulate with no pain.    Chief Complaint : Patient is a 84y old  Female who presents with a chief complaint of fall (11 Feb 2025 15:34)    HPI : HPI:  This is an 85yo F PMHx HTN, CHF, A-fib not on AC presents to ED for evaluation for possible MAL placement.  Patient was seen in ED 2/4 s/p mechanical fall, all imaging was negative for acute injuries and had physical therapy evaluation that recommended subacute rehab however patient and family elected to be discharged home.  Patient now returning stating she is having difficulty ambulating at home despite cane and walker and would like to go to MAL.  No new injuries or trauma since last ED visit.  X-ray from ED visit showed right second, third and fifth metatarsal fracture which is likely old from fall in early January.  Patient denies any right foot pain.  Denies fever, chills, CP, SOB, fall, trauma, back pain, bowel/bladder incontinence. 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 (11 Feb 2025 15:34)      Patient admits to  (-) Fevers, (-) Chills, (-) Nausea, (-) Vomiting, (-) Shortness of Breath      PMH: Cataract    Chronic CHF    Atrial fibrillation      PSH:    Allergies:No Known Allergies      Labs:                          13.2   9.90  )-----------( 284      ( 11 Feb 2025 15:17 )             40.0     WBC Trend  9.90 Date (02-11 @ 15:17)  9.14 Date (02-04 @ 14:08)      Chem  02-11    139  |  98  |  24[H]  ----------------------------<  159[H]  3.4[L]   |  37[H]  |  0.97    Ca    8.2[L]      11 Feb 2025 15:17  Mg     2.2     02-11    TPro  6.0  /  Alb  3.0[L]  /  TBili  1.5[H]  /  DBili  x   /  AST  24  /  ALT  30  /  AlkPhos  130[H]  02-11          T(F): 97 (02-11-25 @ 10:22), Max: 97 (02-11-25 @ 10:22)  HR: 99 (02-11-25 @ 10:22) (99 - 99)  BP: 128/66 (02-11-25 @ 10:22) (128/66 - 128/66)  RR: 16 (02-11-25 @ 10:22) (16 - 16)  SpO2: 99% (02-11-25 @ 10:22) (99% - 99%)  Wt(kg): --    O:   General: Pleasant  female NAD & AOX3.    Integument:  Skin warm, dry and supple bilateral.    No breaks in the integument, - edema, - erythema, - calor right foot and ankle  Vascular: Dorsalis Pedis and Posterior Tibial pulses 2/4.  Capillary re-fill time less then 3 seconds digits 1-5 bilateral.    Neuro: Protective sensation intact to the level of the digits bilateral.  MSK: Muscle strength 4/5 all major muscle groups bilateral. No pain upon palpation of metatarsal heads 2-5 right foot.  No pain upon palpation of right medial and lateral malleoli.  No pain upon ROM of right ankle

## 2025-02-13 RX ORDER — OSELTAMIVIR PHOSPHATE 75 MG/1
30 CAPSULE ORAL EVERY 24 HOURS
Refills: 0 | Status: DISCONTINUED | OUTPATIENT
Start: 2025-02-13 | End: 2025-02-14

## 2025-02-13 RX ORDER — OSELTAMIVIR PHOSPHATE 75 MG/1
75 CAPSULE ORAL EVERY 24 HOURS
Refills: 0 | Status: DISCONTINUED | OUTPATIENT
Start: 2025-02-13 | End: 2025-02-13

## 2025-02-13 RX ADMIN — Medication 40 MILLIGRAM(S): at 05:27

## 2025-02-13 RX ADMIN — Medication 25 MILLIGRAM(S): at 05:27

## 2025-02-13 RX ADMIN — POTASSIUM CHLORIDE 40 MILLIEQUIVALENT(S): 750 TABLET, EXTENDED RELEASE ORAL at 05:27

## 2025-02-13 RX ADMIN — ENOXAPARIN SODIUM 40 MILLIGRAM(S): 100 INJECTION SUBCUTANEOUS at 05:26

## 2025-02-13 RX ADMIN — OSELTAMIVIR PHOSPHATE 30 MILLIGRAM(S): 75 CAPSULE ORAL at 09:53

## 2025-02-13 NOTE — SOCIAL WORK PROGRESS NOTE - NSSWPROGRESSNOTE_GEN_ALL_CORE
Called daughter for additional choices of Subacute rehab. She has not chosen additional facilities. Educated her and will send to [facility] around patients home . [facility] is agreeable to accepting patient  after three night stay into an isolation room if available, Educated daughter.

## 2025-02-14 ENCOUNTER — TRANSCRIPTION ENCOUNTER (OUTPATIENT)
Age: 85
End: 2025-02-14

## 2025-02-14 VITALS
OXYGEN SATURATION: 92 % | DIASTOLIC BLOOD PRESSURE: 76 MMHG | HEART RATE: 84 BPM | RESPIRATION RATE: 18 BRPM | SYSTOLIC BLOOD PRESSURE: 110 MMHG | TEMPERATURE: 98 F

## 2025-02-14 RX ORDER — OSELTAMIVIR PHOSPHATE 75 MG/1
1 CAPSULE ORAL
Qty: 8 | Refills: 0
Start: 2025-02-14 | End: 2025-02-21

## 2025-02-14 RX ADMIN — Medication 40 MILLIGRAM(S): at 05:21

## 2025-02-14 RX ADMIN — Medication 25 MILLIGRAM(S): at 05:21

## 2025-02-14 RX ADMIN — ENOXAPARIN SODIUM 40 MILLIGRAM(S): 100 INJECTION SUBCUTANEOUS at 05:20

## 2025-02-14 RX ADMIN — OSELTAMIVIR PHOSPHATE 30 MILLIGRAM(S): 75 CAPSULE ORAL at 09:57

## 2025-02-14 NOTE — PROGRESS NOTE ADULT - ASSESSMENT
The patient is an 84 year old female with a history of HTN, chronic diastolic heart failure, atrial fibrillation who presents with a fall.    Plan:  - ECG with atrial fibrillation and LVH related abnormalities  - As per outpatient notes, she is not on anticoagulation for atrial fibrillation due to significant bleeding from a leg wound and for falls  - Outpatient echo 3/24 with normal LV systolic function, mild MS, severe MR, severe TR, mild pulm HTN  - orthostatics negative  - Continue furosemide 40 mg daily  - Continue spironolactone 25 mg daily  - May benefit from outpatient structural heart and EP evals (may benefit from mitral valve intervention and LAAO device implantation)  - PT/MAL
84 y.o with right foot pain
84 y.o with right foot pain
The patient is an 84 year old female with a history of HTN, chronic diastolic heart failure, atrial fibrillation who presents with a fall.    Plan:  - ECG with atrial fibrillation and LVH related abnormalities  - As per outpatient notes, she is not on anticoagulation for atrial fibrillation due to significant bleeding from a leg wound and for falls  - Outpatient echo 3/24 with normal LV systolic function, mild MS, severe MR, severe TR, mild pulm HTN  - Orthostatics negative  - Continue furosemide 40 mg daily  - Continue spironolactone 25 mg daily  - May benefit from outpatient structural heart and EP evals (may benefit from mitral valve intervention and LAAO device implantation)  - PT/MAL
84 y.o with right foot pain

## 2025-02-14 NOTE — DISCHARGE NOTE NURSING/CASE MANAGEMENT/SOCIAL WORK - PATIENT PORTAL LINK FT
You can access the FollowMyHealth Patient Portal offered by [facility] by registering at the following website: [email or web address]. By joining [employer]’s FollowMyHealth portal, you will also be able to view your health information using other applications (apps) compatible with our system.

## 2025-02-14 NOTE — PROGRESS NOTE ADULT - PROVIDER SPECIALTY LIST ADULT
Cardiology
Cardiology
Internal Medicine
Podiatry
Internal Medicine
Internal Medicine

## 2025-02-14 NOTE — PROGRESS NOTE ADULT - SUBJECTIVE AND OBJECTIVE BOX
Chief Complaint: Fall    Interval Events: No events overnight.    Review of Systems:  General: No fevers, chills, weight gain  Skin: No rashes, color changes  Cardiovascular: No chest pain, orthopnea  Respiratory: No shortness of breath, cough  Gastrointestinal: No nausea, abdominal pain  Genitourinary: No incontinence, pain with urination  Musculoskeletal: No pain, swelling, decreased range of motion  Neurological: No headache, weakness  Psychiatric: No depression, anxiety  Endocrine: No weight gain, increased thirst  All other systems are comprehensively negative.    Physical Exam:  Vital Signs Last 24 Hrs  T(C): 36.6 (14 Feb 2025 04:25), Max: 37.8 (13 Feb 2025 20:11)  T(F): 97.8 (14 Feb 2025 04:25), Max: 100 (13 Feb 2025 20:11)  HR: 91 (14 Feb 2025 04:25) (90 - 100)  BP: 130/67 (14 Feb 2025 04:25) (103/64 - 130/67)  BP(mean): --  RR: 18 (14 Feb 2025 04:25) (18 - 19)  SpO2: 96% (14 Feb 2025 04:25) (93% - 96%)  Parameters below as of 14 Feb 2025 04:25  Patient On (Oxygen Delivery Method): room air  O2 Flow (L/min): 2  General: NAD  HEENT: MMM  Neck: No JVD, no carotid bruit  Lungs: CTAB  CV: RRR, nl S1/S2, no M/R/G  Abdomen: S/NT/ND, +BS  Extremities: No LE edema, no cyanosis  Neuro: AAOx3, non-focal  Skin: No rash    Labs:             02-12    141  |  98  |  23  ----------------------------<  143[H]  3.3[L]   |  38[H]  |  0.78    Ca    8.5      12 Feb 2025 09:25  Mg     2.2     02-12                          14.2   6.53  )-----------( 263      ( 12 Feb 2025 09:25 )             42.6         ECG/Telemetry: Atrial fibrillation
  PROGRESS NOTE   Patient is a 84y old  Female who presents with a chief complaint of fall (11 Feb 2025 16:52)      HPI:  This is an 85yo F PMHx HTN, CHF, A-fib not on AC presents to ED for evaluation for possible MAL placement.  Patient was seen in ED 2/4 s/p mechanical fall, all imaging was negative for acute injuries and had physical therapy evaluation that recommended subacute rehab however patient and family elected to be discharged home.  Patient now returning stating she is having difficulty ambulating at home despite cane and walker and would like to go to MAL.  No new injuries or trauma since last ED visit.  X-ray from ED visit showed right second, third and fifth metatarsal fracture which is likely old from fall in early January.  Patient denies any right foot pain.  Denies fever, chills, CP, SOB, fall, trauma, back pain, bowel/bladder incontinence. 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 (11 Feb 2025 15:34)      Vital Signs Last 24 Hrs  T(C): 36.9 (12 Feb 2025 05:15), Max: 36.9 (12 Feb 2025 05:15)  T(F): 98.4 (12 Feb 2025 05:15), Max: 98.4 (12 Feb 2025 05:15)  HR: 80 (12 Feb 2025 05:15) (80 - 97)  BP: 157/62 (12 Feb 2025 05:15) (124/75 - 157/62)  BP(mean): --  RR: 18 (12 Feb 2025 05:15) (16 - 18)  SpO2: 96% (12 Feb 2025 05:15) (92% - 96%)    Parameters below as of 12 Feb 2025 05:15  Patient On (Oxygen Delivery Method): room air                              14.2   6.53  )-----------( 263      ( 12 Feb 2025 09:25 )             42.6               02-12    141  |  98  |  23  ----------------------------<  143[H]  3.3[L]   |  38[H]  |  0.78    Ca    8.5      12 Feb 2025 09:25  Mg     2.2     02-12    TPro  6.0  /  Alb  3.0[L]  /  TBili  1.5[H]  /  DBili  x   /  AST  24  /  ALT  30  /  AlkPhos  130[H]  02-11    Physical examination:   General: Pleasant  female NAD & AOX3.    Integument:  Skin warm, dry and supple bilateral.    No breaks in the integument, - edema, - erythema, - calor right foot and ankle  Vascular: Dorsalis Pedis and Posterior Tibial pulses 2/4.  Capillary re-fill time less then 3 seconds digits 1-5 bilateral.    Neuro: Protective sensation intact to the level of the digits bilateral.  MSK: Muscle strength 4/5 all major muscle groups bilateral. No pain upon palpation of metatarsal heads 2-5 right foot.  No pain upon palpation of right medial and lateral malleoli.  No pain upon ROM of right ankle  
Chief Complaint: Fall    Interval Events: No events overnight.    Review of Systems:  General: No fevers, chills, weight gain  Skin: No rashes, color changes  Cardiovascular: No chest pain, orthopnea  Respiratory: No shortness of breath, cough  Gastrointestinal: No nausea, abdominal pain  Genitourinary: No incontinence, pain with urination  Musculoskeletal: No pain, swelling, decreased range of motion  Neurological: No headache, weakness  Psychiatric: No depression, anxiety  Endocrine: No weight gain, increased thirst  All other systems are comprehensively negative.    Physical Exam:  Vitals:        Vital Signs Last 24 Hrs  T(C): 36.7 (13 Feb 2025 04:42), Max: 36.7 (13 Feb 2025 04:42)  T(F): 98 (13 Feb 2025 04:42), Max: 98 (13 Feb 2025 04:42)  HR: 89 (13 Feb 2025 04:42) (89 - 95)  BP: 145/79 (13 Feb 2025 04:42) (111/70 - 145/79)  BP(mean): --  RR: 18 (13 Feb 2025 04:42) (18 - 18)  SpO2: 93% (13 Feb 2025 04:42) (92% - 93%)  Parameters below as of 13 Feb 2025 04:42  Patient On (Oxygen Delivery Method): nasal cannula  O2 Flow (L/min): 2  General: NAD  HEENT: MMM  Neck: No JVD, no carotid bruit  Lungs: CTAB  CV: RRR, nl S1/S2, no M/R/G  Abdomen: S/NT/ND, +BS  Extremities: No LE edema, no cyanosis  Neuro: AAOx3, non-focal  Skin: No rash    Labs:             02-12    141  |  98  |  23  ----------------------------<  143[H]  3.3[L]   |  38[H]  |  0.78    Ca    8.5      12 Feb 2025 09:25  Mg     2.2     02-12    TPro  6.0  /  Alb  3.0[L]  /  TBili  1.5[H]  /  DBili  x   /  AST  24  /  ALT  30  /  AlkPhos  130[H]  02-11                        14.2   6.53  )-----------( 263      ( 12 Feb 2025 09:25 )             42.6         ECG/Telemetry: Atrial fibrillation
  PROGRESS NOTE   Patient is a 84y old  Female who presents with a chief complaint of fall (14 Feb 2025 10:40)      HPI:  This is an 83yo F PMHx HTN, CHF, A-fib not on AC presents to ED for evaluation for possible MAL placement.  Patient was seen in ED 2/4 s/p mechanical fall, all imaging was negative for acute injuries and had physical therapy evaluation that recommended subacute rehab however patient and family elected to be discharged home.  Patient now returning stating she is having difficulty ambulating at home despite cane and walker and would like to go to MAL.  No new injuries or trauma since last ED visit.  X-ray from ED visit showed right second, third and fifth metatarsal fracture which is likely old from fall in early January.  Patient denies any right foot pain.  Denies fever, chills, CP, SOB, fall, trauma, back pain, bowel/bladder incontinence. 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 (11 Feb 2025 15:34)      Vital Signs Last 24 Hrs  T(C): 36.6 (14 Feb 2025 04:25), Max: 37.8 (13 Feb 2025 20:11)  T(F): 97.8 (14 Feb 2025 04:25), Max: 100 (13 Feb 2025 20:11)  HR: 91 (14 Feb 2025 04:25) (90 - 100)  BP: 130/67 (14 Feb 2025 04:25) (103/64 - 130/67)  BP(mean): --  RR: 18 (14 Feb 2025 04:25) (18 - 19)  SpO2: 96% (14 Feb 2025 04:25) (93% - 96%)    Parameters below as of 14 Feb 2025 04:25  Patient On (Oxygen Delivery Method): room air  O2 Flow (L/min): 2                      Physical examination:   General: Pleasant  female NAD & AOX3.    Integument:  Skin warm, dry and supple bilateral.    No breaks in the integument, - edema, - erythema, - calor right foot and ankle  Vascular: Dorsalis Pedis and Posterior Tibial pulses 2/4.  Capillary re-fill time less then 3 seconds digits 1-5 bilateral.    Neuro: Protective sensation intact to the level of the digits bilateral.  MSK: Muscle strength 4/5 all major muscle groups bilateral. No pain upon palpation of metatarsal heads 2-5 right foot.  No pain upon palpation of right medial and lateral malleoli.  No pain upon ROM of right ankle  
  PROGRESS NOTE   Patient is a 84y old  Female who presents with a chief complaint of fall (13 Feb 2025 10:47)      HPI:  This is an 83yo F PMHx HTN, CHF, A-fib not on AC presents to ED for evaluation for possible MAL placement.  Patient was seen in ED 2/4 s/p mechanical fall, all imaging was negative for acute injuries and had physical therapy evaluation that recommended subacute rehab however patient and family elected to be discharged home.  Patient now returning stating she is having difficulty ambulating at home despite cane and walker and would like to go to MAL.  No new injuries or trauma since last ED visit.  X-ray from ED visit showed right second, third and fifth metatarsal fracture which is likely old from fall in early January.  Patient denies any right foot pain.  Denies fever, chills, CP, SOB, fall, trauma, back pain, bowel/bladder incontinence. 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 (11 Feb 2025 15:34)      Vital Signs Last 24 Hrs  T(C): 36.9 (13 Feb 2025 12:29), Max: 36.9 (13 Feb 2025 12:29)  T(F): 98.4 (13 Feb 2025 12:29), Max: 98.4 (13 Feb 2025 12:29)  HR: 90 (13 Feb 2025 12:29) (89 - 95)  BP: 109/80 (13 Feb 2025 12:29) (109/80 - 145/79)  BP(mean): --  RR: 19 (13 Feb 2025 12:29) (18 - 19)  SpO2: 93% (13 Feb 2025 12:29) (92% - 93%)    Parameters below as of 13 Feb 2025 12:29  Patient On (Oxygen Delivery Method): nasal cannula  O2 Flow (L/min): 2                            14.2   6.53  )-----------( 263      ( 12 Feb 2025 09:25 )             42.6               02-12    141  |  98  |  23  ----------------------------<  143[H]  3.3[L]   |  38[H]  |  0.78    Ca    8.5      12 Feb 2025 09:25  Mg     2.2     02-12    TPro  6.0  /  Alb  3.0[L]  /  TBili  1.5[H]  /  DBili  x   /  AST  24  /  ALT  30  /  AlkPhos  130[H]  02-11    Physical examination:   General: Pleasant  female NAD & AOX3.    Integument:  Skin warm, dry and supple bilateral.    No breaks in the integument, - edema, - erythema, - calor right foot and ankle  Vascular: Dorsalis Pedis and Posterior Tibial pulses 2/4.  Capillary re-fill time less then 3 seconds digits 1-5 bilateral.    Neuro: Protective sensation intact to the level of the digits bilateral.  MSK: Muscle strength 4/5 all major muscle groups bilateral. No pain upon palpation of metatarsal heads 2-5 right foot.  No pain upon palpation of right medial and lateral malleoli.  No pain upon ROM of right ankle  
Date of Service: 02-13-25 @ 10:48    Patient is a 84y old  Female who presents with a chief complaint of fall (12 Feb 2025 12:17)      INTERVAL HPI/OVERNIGHT EVENTS: Patient seen and examined. NAD. No complaints.    Vital Signs Last 24 Hrs  T(C): 36.7 (13 Feb 2025 04:42), Max: 36.7 (13 Feb 2025 04:42)  T(F): 98 (13 Feb 2025 04:42), Max: 98 (13 Feb 2025 04:42)  HR: 89 (13 Feb 2025 04:42) (89 - 95)  BP: 145/79 (13 Feb 2025 04:42) (111/70 - 145/79)  BP(mean): --  RR: 18 (13 Feb 2025 04:42) (18 - 18)  SpO2: 93% (13 Feb 2025 04:42) (92% - 93%)    Parameters below as of 13 Feb 2025 04:42  Patient On (Oxygen Delivery Method): nasal cannula  O2 Flow (L/min): 2      02-12    141  |  98  |  23  ----------------------------<  143[H]  3.3[L]   |  38[H]  |  0.78    Ca    8.5      12 Feb 2025 09:25  Mg     2.2     02-12    TPro  6.0  /  Alb  3.0[L]  /  TBili  1.5[H]  /  DBili  x   /  AST  24  /  ALT  30  /  AlkPhos  130[H]  02-11                          14.2   6.53  )-----------( 263      ( 12 Feb 2025 09:25 )             42.6       CAPILLARY BLOOD GLUCOSE        Urinalysis Basic - ( 12 Feb 2025 09:25 )    Color: x / Appearance: x / SG: x / pH: x  Gluc: 143 mg/dL / Ketone: x  / Bili: x / Urobili: x   Blood: x / Protein: x / Nitrite: x   Leuk Esterase: x / RBC: x / WBC x   Sq Epi: x / Non Sq Epi: x / Bacteria: x              acetaminophen     Tablet .. 650 milliGRAM(s) Oral every 6 hours PRN  aluminum hydroxide/magnesium hydroxide/simethicone Suspension 30 milliLiter(s) Oral every 4 hours PRN  enoxaparin Injectable 40 milliGRAM(s) SubCutaneous every 24 hours  furosemide    Tablet 40 milliGRAM(s) Oral daily  influenza  Vaccine (HIGH DOSE) 0.5 milliLiter(s) IntraMuscular once  melatonin 3 milliGRAM(s) Oral at bedtime PRN  ondansetron Injectable 4 milliGRAM(s) IV Push every 6 hours PRN  oseltamivir 30 milliGRAM(s) Oral every 24 hours  spironolactone 25 milliGRAM(s) Oral daily              REVIEW OF SYSTEMS:  CONSTITUTIONAL: No fever, no weight loss, or no fatigue  NECK: No pain, no stiffness  RESPIRATORY: No cough, no wheezing, no chills, no hemoptysis, No shortness of breath  CARDIOVASCULAR: No chest pain, no palpitations, no dizziness, no leg swelling  GASTROINTESTINAL: No abdominal pain. No nausea, no vomiting, no hematemesis; No diarrhea, no constipation. No melena, no hematochezia.  GENITOURINARY: No dysuria, no frequency, no hematuria, no incontinence  NEUROLOGICAL: No headaches, no loss of strength, no numbness, no tremors  SKIN: No itching, no burning  MUSCULOSKELETAL: No joint pain, no swelling; No muscle, no back, no extremity pain  PSYCHIATRIC: No depression, no mood swings,   HEME/LYMPH: No easy bruising, no bleeding gums  ALLERY AND IMMUNOLOGIC: No hives       Consultant(s) Notes Reviewed:  [X] YES  [ ] NO    PHYSICAL EXAM:  GENERAL: NAD  HEAD:  Atraumatic, Normocephalic  EYES: EOMI, PERRLA, conjunctiva and sclera clear  ENMT: No tonsillar erythema, exudates, or enlargement; Moist mucous membranes  NECK: Supple, No JVD  NERVOUS SYSTEM:  Awake & alert  CHEST/LUNG: Clear to auscultation bilaterally; No rales, rhonchi, wheezing,  HEART: Regular rate and rhythm  ABDOMEN: Soft, Nontender, Nondistended; Bowel sounds present  EXTREMITIES:  No clubbing, cyanosis, or edema  LYMPH: No lymphadenopathy noted  SKIN: No rashes      Advanced care planning discussed with patient/family [X] YES   [ ] NO    Advanced care planning discussed with patient/family. Patient's health status was discussed. All appropriate changes have been made regarding patient's end-of-life care. Advanced care planning forms reviewed/discussed/completed.  20 minutes spent.   
Date of Service: 02-12-25 @ 14:16    Patient is a 84y old  Female who presents with a chief complaint of fall (12 Feb 2025 12:17)      INTERVAL HPI/OVERNIGHT EVENTS: Patient seen and examined. NAD. No complaints.    Vital Signs Last 24 Hrs  T(C): 36.9 (12 Feb 2025 05:15), Max: 36.9 (12 Feb 2025 05:15)  T(F): 98.4 (12 Feb 2025 05:15), Max: 98.4 (12 Feb 2025 05:15)  HR: 80 (12 Feb 2025 05:15) (80 - 97)  BP: 157/62 (12 Feb 2025 05:15) (124/75 - 157/62)  BP(mean): --  RR: 18 (12 Feb 2025 05:15) (16 - 18)  SpO2: 96% (12 Feb 2025 05:15) (92% - 96%)    Parameters below as of 12 Feb 2025 05:15  Patient On (Oxygen Delivery Method): room air        02-12    141  |  98  |  23  ----------------------------<  143[H]  3.3[L]   |  38[H]  |  0.78    Ca    8.5      12 Feb 2025 09:25  Mg     2.2     02-12    TPro  6.0  /  Alb  3.0[L]  /  TBili  1.5[H]  /  DBili  x   /  AST  24  /  ALT  30  /  AlkPhos  130[H]  02-11                          14.2   6.53  )-----------( 263      ( 12 Feb 2025 09:25 )             42.6       CAPILLARY BLOOD GLUCOSE        Urinalysis Basic - ( 12 Feb 2025 09:25 )    Color: x / Appearance: x / SG: x / pH: x  Gluc: 143 mg/dL / Ketone: x  / Bili: x / Urobili: x   Blood: x / Protein: x / Nitrite: x   Leuk Esterase: x / RBC: x / WBC x   Sq Epi: x / Non Sq Epi: x / Bacteria: x              acetaminophen     Tablet .. 650 milliGRAM(s) Oral every 6 hours PRN  aluminum hydroxide/magnesium hydroxide/simethicone Suspension 30 milliLiter(s) Oral every 4 hours PRN  enoxaparin Injectable 40 milliGRAM(s) SubCutaneous every 24 hours  furosemide    Tablet 40 milliGRAM(s) Oral daily  influenza  Vaccine (HIGH DOSE) 0.5 milliLiter(s) IntraMuscular once  melatonin 3 milliGRAM(s) Oral at bedtime PRN  ondansetron Injectable 4 milliGRAM(s) IV Push every 6 hours PRN  spironolactone 25 milliGRAM(s) Oral daily              REVIEW OF SYSTEMS:  CONSTITUTIONAL: No fever, no weight loss, or no fatigue  NECK: No pain, no stiffness  RESPIRATORY: No cough, no wheezing, no chills, no hemoptysis, No shortness of breath  CARDIOVASCULAR: No chest pain, no palpitations, no dizziness, no leg swelling  GASTROINTESTINAL: No abdominal pain. No nausea, no vomiting, no hematemesis; No diarrhea, no constipation. No melena, no hematochezia.  GENITOURINARY: No dysuria, no frequency, no hematuria, no incontinence  NEUROLOGICAL: No headaches, no loss of strength, no numbness, no tremors  SKIN: No itching, no burning  MUSCULOSKELETAL: No joint pain, no swelling; No muscle, no back, no extremity pain  PSYCHIATRIC: No depression, no mood swings,   HEME/LYMPH: No easy bruising, no bleeding gums  ALLERY AND IMMUNOLOGIC: No hives       Consultant(s) Notes Reviewed:  [X] YES  [ ] NO    PHYSICAL EXAM:  GENERAL: NAD  HEAD:  Atraumatic, Normocephalic  EYES: EOMI, PERRLA, conjunctiva and sclera clear  ENMT: No tonsillar erythema, exudates, or enlargement; Moist mucous membranes  NECK: Supple, No JVD  NERVOUS SYSTEM:  Awake & alert  CHEST/LUNG: Clear to auscultation bilaterally; No rales, rhonchi, wheezing,  HEART: Regular rate and rhythm  ABDOMEN: Soft, Nontender, Nondistended; Bowel sounds present  EXTREMITIES:  No clubbing, cyanosis, or edema  LYMPH: No lymphadenopathy noted  SKIN: No rashes      Advanced care planning discussed with patient/family [X] YES   [ ] NO    Advanced care planning discussed with patient/family. Patient's health status was discussed. All appropriate changes have been made regarding patient's end-of-life care. Advanced care planning forms reviewed/discussed/completed.  20 minutes spent.   
Date of Service: 02-14-25 @ 10:41    Patient is a 84y old  Female who presents with a chief complaint of fall (14 Feb 2025 06:03)      INTERVAL HPI/OVERNIGHT EVENTS: Patient seen and examined. NAD. No complaints.    Vital Signs Last 24 Hrs  T(C): 36.6 (14 Feb 2025 04:25), Max: 37.8 (13 Feb 2025 20:11)  T(F): 97.8 (14 Feb 2025 04:25), Max: 100 (13 Feb 2025 20:11)  HR: 91 (14 Feb 2025 04:25) (90 - 100)  BP: 130/67 (14 Feb 2025 04:25) (103/64 - 130/67)  BP(mean): --  RR: 18 (14 Feb 2025 04:25) (18 - 19)  SpO2: 96% (14 Feb 2025 04:25) (93% - 96%)    Parameters below as of 14 Feb 2025 04:25  Patient On (Oxygen Delivery Method): room air  O2 Flow (L/min): 2                CAPILLARY BLOOD GLUCOSE                  acetaminophen     Tablet .. 650 milliGRAM(s) Oral every 6 hours PRN  aluminum hydroxide/magnesium hydroxide/simethicone Suspension 30 milliLiter(s) Oral every 4 hours PRN  enoxaparin Injectable 40 milliGRAM(s) SubCutaneous every 24 hours  furosemide    Tablet 40 milliGRAM(s) Oral daily  influenza  Vaccine (HIGH DOSE) 0.5 milliLiter(s) IntraMuscular once  melatonin 3 milliGRAM(s) Oral at bedtime PRN  ondansetron Injectable 4 milliGRAM(s) IV Push every 6 hours PRN  oseltamivir 30 milliGRAM(s) Oral every 24 hours  spironolactone 25 milliGRAM(s) Oral daily              REVIEW OF SYSTEMS:  CONSTITUTIONAL: No fever, no weight loss, or no fatigue  NECK: No pain, no stiffness  RESPIRATORY: No cough, no wheezing, no chills, no hemoptysis, No shortness of breath  CARDIOVASCULAR: No chest pain, no palpitations, no dizziness, no leg swelling  GASTROINTESTINAL: No abdominal pain. No nausea, no vomiting, no hematemesis; No diarrhea, no constipation. No melena, no hematochezia.  GENITOURINARY: No dysuria, no frequency, no hematuria, no incontinence  NEUROLOGICAL: No headaches, no loss of strength, no numbness, no tremors  SKIN: No itching, no burning  MUSCULOSKELETAL: No joint pain, no swelling; No muscle, no back, no extremity pain  PSYCHIATRIC: No depression, no mood swings,   HEME/LYMPH: No easy bruising, no bleeding gums  ALLERY AND IMMUNOLOGIC: No hives       Consultant(s) Notes Reviewed:  [X] YES  [ ] NO    PHYSICAL EXAM:  GENERAL: NAD  HEAD:  Atraumatic, Normocephalic  EYES: EOMI, PERRLA, conjunctiva and sclera clear  ENMT: No tonsillar erythema, exudates, or enlargement; Moist mucous membranes  NECK: Supple, No JVD  NERVOUS SYSTEM:  Awake & alert  CHEST/LUNG: Clear to auscultation bilaterally; No rales, rhonchi, wheezing,  HEART: Regular rate and rhythm  ABDOMEN: Soft, Nontender, Nondistended; Bowel sounds present  EXTREMITIES:  No clubbing, cyanosis, or edema  LYMPH: No lymphadenopathy noted  SKIN: No rashes      Advanced care planning discussed with patient/family [X] YES   [ ] NO    Advanced care planning discussed with patient/family. Patient's health status was discussed. All appropriate changes have been made regarding patient's end-of-life care. Advanced care planning forms reviewed/discussed/completed.  20 minutes spent.

## 2025-02-14 NOTE — DISCHARGE NOTE PROVIDER - HOSPITAL COURSE
This is an 85yo F PMHx HTN, CHF, A-fib not on AC presents to ED for evaluation for possible MAL placement.  Patient was seen in ED 2/4 s/p mechanical fall, all imaging was negative for acute injuries and had physical therapy evaluation that recommended subacute rehab however patient and family elected to be discharged home.  Patient now returning stating she is having difficulty ambulating at home despite cane and walker and would like to go to MAL.  No new injuries or trauma since last ED visit.  X-ray from ED visit showed right second, third and fifth metatarsal fracture which is likely old from fall in early January.  Patient denies any right foot pain.  Denies fever, chills, CP, SOB, fall, trauma, back pain, bowel/bladder incontinence. 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    Patient admitted for MAL placement  Patient changed her mind  Going home    >35 minutes spent on discharge

## 2025-02-14 NOTE — DISCHARGE NOTE NURSING/CASE MANAGEMENT/SOCIAL WORK - NSDCPEFALRISK_GEN_ALL_CORE
For information on Fall & Injury Prevention, visit: [email or web address] OR  [email or web address] OR  https://www.cdc.gov/steadi/patient.html

## 2025-02-14 NOTE — PROGRESS NOTE ADULT - PROBLEM SELECTOR PROBLEM 1
Foot fracture, right
Multiple falls

## 2025-02-14 NOTE — PROGRESS NOTE ADULT - PROBLEM SELECTOR PLAN 3
Compensated  Continue Lasix and spironolactone

## 2025-02-14 NOTE — DISCHARGE NOTE PROVIDER - NSDCMRMEDTOKEN_GEN_ALL_CORE_FT
Lasix 20 mg oral tablet: 1 tab(s) orally once a day  Lasix 40 mg oral tablet: 1 tab(s) orally once a day  spironolactone 25 mg oral tablet: 1 tab(s) orally once a day   Lasix 20 mg oral tablet: 1 tab(s) orally once a day  Lasix 40 mg oral tablet: 1 tab(s) orally once a day  oseltamivir 30 mg oral capsule: 1 cap(s) orally every 24 hours  spironolactone 25 mg oral tablet: 1 tab(s) orally once a day

## 2025-02-14 NOTE — DISCHARGE NOTE PROVIDER - NSDCCPCAREPLAN_GEN_ALL_CORE_FT
PRINCIPAL DISCHARGE DIAGNOSIS  Diagnosis: Decreased functional mobility  Assessment and Plan of Treatment: Follow-up with your primary care doctor within 1 week.

## 2025-02-14 NOTE — DISCHARGE NOTE NURSING/CASE MANAGEMENT/SOCIAL WORK - NSDCVIVACCINE_GEN_ALL_CORE_FT
Tdap; 02-Jan-2025 14:54; Karely Myles (RN); [employer]; [id number] (Exp. Date: 01-Aug-2026); IntraMuscular; Deltoid Left.; 0.5 milliLiter(s); VIS (VIS Published: 09-May-2013, VIS Presented: 02-Jan-2025);

## 2025-02-14 NOTE — PROGRESS NOTE ADULT - PROBLEM SELECTOR PLAN 1
Chart reviewed and Patient evaluated  Discussed diagnosis and treatment with patient. Patient states no pain on right foot  Xrays right foot reviewed, noted hx of  2nd, 3rd, and 5th metatarsal head fractures,   Xrays right ankle reveal Medial malleolus inferior tip of displaced fracture.  Due to pt history of falls, recommend PT consult for possible MAL placement  Ordered to apply Bhavesh compression on right lower extremity  Rec wb as tolerated with use of walker  No podiatric intervention at this time  Will discuss with all attendings
PT  Fall precautions  Patient wants to go home
Chart reviewed and Patient evaluated  Discussed diagnosis and treatment with patient. Patient states no pain on right foot  Xrays right foot reviewed, noted hx of  2nd, 3rd, and 5th metatarsal head fractures,   Xrays right ankle reveal Medial malleolus inferior tip of displaced fracture.  Due to pt history of falls, recommend PT consult for possible MAL placement  Ordered to apply Bhavesh compression on right lower extremity  Rec wb as tolerated with use of walker  No podiatric intervention at this time  Stable from podiatry standpoint  Will discuss with all attendings
Chart reviewed and Patient evaluated  Discussed diagnosis and treatment with patient. Patient states no pain on right foot  Xrays right foot reviewed, noted hx of  2nd, 3rd, and 5th metatarsal head fractures,   Xrays right ankle reveal Medial malleolus inferior tip of displaced fracture.  Due to pt history of falls, recs possible MAL placement  Rec wb as tolerated with use of walker  No podiatric intervention at this time  Patient can follow up with Dr. Tee Munoz 3-5 days after discharge. Please call [phone] for any question.  Stable from podiatry standpoint  Will discuss with all attendings
PT  Fall precautions  Needs MAL
PT  Fall precautions  Needs MAL

## 2025-02-14 NOTE — PROGRESS NOTE ADULT - PROBLEM SELECTOR PLAN 4
Rate controlled  Not on AC due to falls  Cardio consult noted

## 2025-02-14 NOTE — PROGRESS NOTE ADULT - TIME BILLING
Extensive chart review  Extensive discussion with the patient and the family   Emotional support and counseling provided regarding various aspects of the patient's care  Opportunity to ask questions was provided, all questions/concerns were addressed.
Extensive chart review  Extensive discussion with the patient and the family at the bedside  Emotional support and counseling provided regarding various aspects of the patient's care  Opportunity to ask questions was provided, all questions/concerns were addressed.
Extensive chart review  Extensive discussion with the patient and the family   Emotional support and counseling provided regarding various aspects of the patient's care  Opportunity to ask questions was provided, all questions/concerns were addressed.

## 2025-02-14 NOTE — DISCHARGE NOTE NURSING/CASE MANAGEMENT/SOCIAL WORK - FINANCIAL ASSISTANCE
[facility] provides services at a reduced cost to those who are determined to be eligible through [facility]’s financial assistance program. Information regarding [facility]’s financial assistance program can be found by going to [email or web address] or by calling [phone].

## 2025-02-14 NOTE — DISCHARGE NOTE PROVIDER - CARE PROVIDER_API CALL
Lani Villegas  Internal Medicine  [address] [place] [id number]  Phone: [phone]  Fax: [phone]  Established Patient  Follow Up Time: 1 week

## 2025-02-14 NOTE — PROGRESS NOTE ADULT - NSPROGADDITIONALINFOA_GEN_ALL_CORE
Patient had exposure to flu from roommate. Will start Tamiflu prophylaxis.
Patient had exposure to flu from roommate. On Tamiflu prophylaxis.  D/C home

## 2025-03-18 PROCEDURE — 97116 GAIT TRAINING THERAPY: CPT

## 2025-03-18 PROCEDURE — 36415 COLL VENOUS BLD VENIPUNCTURE: CPT

## 2025-03-18 PROCEDURE — 97530 THERAPEUTIC ACTIVITIES: CPT

## 2025-03-18 PROCEDURE — 83735 ASSAY OF MAGNESIUM: CPT

## 2025-03-18 PROCEDURE — 85027 COMPLETE CBC AUTOMATED: CPT

## 2025-03-18 PROCEDURE — 99285 EMERGENCY DEPT VISIT HI MDM: CPT | Mod: 25

## 2025-03-18 PROCEDURE — 80053 COMPREHEN METABOLIC PANEL: CPT

## 2025-03-18 PROCEDURE — 93005 ELECTROCARDIOGRAM TRACING: CPT

## 2025-03-18 PROCEDURE — 80048 BASIC METABOLIC PNL TOTAL CA: CPT

## 2025-03-18 PROCEDURE — 71045 X-RAY EXAM CHEST 1 VIEW: CPT

## 2025-03-18 PROCEDURE — 97162 PT EVAL MOD COMPLEX 30 MIN: CPT

## 2025-03-25 ENCOUNTER — NON-APPOINTMENT (OUTPATIENT)
Age: 85
End: 2025-03-25

## 2025-03-25 ENCOUNTER — APPOINTMENT (OUTPATIENT)
Dept: CARDIOLOGY | Facility: CLINIC | Age: 85
End: 2025-03-25
Payer: MEDICARE

## 2025-03-25 VITALS
OXYGEN SATURATION: 96 % | SYSTOLIC BLOOD PRESSURE: 177 MMHG | BODY MASS INDEX: 19.14 KG/M2 | HEART RATE: 92 BPM | HEIGHT: 63 IN | DIASTOLIC BLOOD PRESSURE: 78 MMHG | WEIGHT: 108 LBS

## 2025-03-25 VITALS — DIASTOLIC BLOOD PRESSURE: 80 MMHG | SYSTOLIC BLOOD PRESSURE: 150 MMHG

## 2025-03-25 DIAGNOSIS — I50.9 HEART FAILURE, UNSPECIFIED: ICD-10-CM

## 2025-03-25 DIAGNOSIS — I48.91 UNSPECIFIED ATRIAL FIBRILLATION: ICD-10-CM

## 2025-03-25 DIAGNOSIS — Z87.891 PERSONAL HISTORY OF NICOTINE DEPENDENCE: ICD-10-CM

## 2025-03-25 PROCEDURE — 99204 OFFICE O/P NEW MOD 45 MIN: CPT

## 2025-03-25 PROCEDURE — 93000 ELECTROCARDIOGRAM COMPLETE: CPT

## 2025-03-26 ENCOUNTER — APPOINTMENT (OUTPATIENT)
Dept: CARDIOLOGY | Facility: CLINIC | Age: 85
End: 2025-03-26
Payer: MEDICARE

## 2025-03-26 PROCEDURE — 93306 TTE W/DOPPLER COMPLETE: CPT

## 2025-03-27 NOTE — PHYSICAL EXAM
[Frail] : frail [Normal Conjunctiva] : normal conjunctiva [No Carotid Bruit] : no carotid bruit [Normal Rate] : normal [Irregularly Irregular] : irregularly irregular [___ +] : bilateral [unfilled]U+ pretibial pitting edema [Rt] : varicose veins of the right leg noted [Lt] : varicose veins of the left leg noted [No Abnormalities] : the abdominal aorta was not enlarged and no bruit was heard [Decreased Breath Sounds] : breath sounds were decreased diffusely [Soft] : abdomen soft [Edema ___] : edema [unfilled] [No Focal Deficits] : no focal deficits [Alert and Oriented] : alert and oriented [Right Carotid Bruit] : no bruit heard over the right carotid [Left Carotid Bruit] : no bruit heard over the left carotid [de-identified] : chronic leg wounds [de-identified] : unsteady gait,

## 2025-03-27 NOTE — HISTORY OF PRESENT ILLNESS
[FreeTextEntry1] : Ms Zapata arrives for post hospitalization follow up . She is new to this practice. Her PCP and prior cardiologist seen in the past is out of [facility].  She is accompanied by her DTR thuy [phone] She is an 85 year old female with a history of HTN, chronic diastolic heart failure, MR/TR, atrial fibrillation not on A/C, COPD who presented to [facility] hospital with a falls and unsteady gait.  Patient was initially seen in [facility] ED on 2/4 s/p mechanical fall, all imaging was negative for acute injuries and had physical therapy evaluation that recommended subacute rehab however patient and family elected to be discharged home.  She then returned to [facility] on 2/14/25 stating she is having difficulty ambulating at home despite cane and walker and wanted to go to [facility] but then again changed her mind.  She was seen by cardiology during her admission (Dr An) who reviewed outpatient notes, she was not on anticoagulation for atrial fibrillation due to significant bleeding from a leg wound and for falls. Was also said to have severe MR and severe TR and pHTN. B/w demonstrated hypokalemia, probnp of 1083. She was discharged on lasix 60mg and spironolactone 25mg  She was started on oxygen during her initial admission due to desaturation on ambulation. Said to have COPD.  She reports being seen by a cardiologist in the past , likely over one year ago, was said she had atrial fibrillation and was prescribed oral A/C (eliquis).  She reports a bad reaction to eliquis and she stopped it. she had been being followed at a wound care clinic due to LE ulcers, said to be from chronic leg swelling. She reports a history of falls specifically over the past year resulting in injury including wrist and rip fractures. Falls were said to be mechanical, trip and falls. The recent fall was associated with bending over while putting bottle in recycle bin. She reports easily loosing balance. she denies falls since her last admission. Her DTR thuy reports they have made the house more suitable for handicap. She lives alone.  She reports a 20 year + 1 PPD smoking history. Denies other toxic habits. Denies FH with premature CAD.

## 2025-03-27 NOTE — HISTORY OF PRESENT ILLNESS
[FreeTextEntry1] : Ms Zapata arrives for post hospitalization follow up . She is new to this practice. Her PCP and prior cardiologist seen in the past is out of [facility].  She is accompanied by her DTR thuy [phone] She is an 85 year old female with a history of HTN, chronic diastolic heart failure, MR/TR, atrial fibrillation not on A/C, COPD who presented to [facility] hospital with a falls and unsteady gait.  Patient was initially seen in [facility] ED on 2/4 s/p mechanical fall, all imaging was negative for acute injuries and had physical therapy evaluation that recommended subacute rehab however patient and family elected to be discharged home.  She then returned to [facility] on 2/14/25 stating she is having difficulty ambulating at home despite cane and walker and wanted to go to [facility] but then again changed her mind.  She was seen by cardiology during her admission (Dr An) who reviewed outpatient notes, she was not on anticoagulation for atrial fibrillation due to significant bleeding from a leg wound and for falls. Was also said to have severe MR and severe TR and pHTN. B/w demonstrated hypokalemia, probnp of 1083. She was discharged on lasix 60mg and spironolactone 25mg  She was started on oxygen during her initial admission due to desaturation on ambulation. Said to have COPD.  She reports being seen by a cardiologist in the past , likely over one year ago, was said she had atrial fibrillation and was prescribed oral A/C (eliquis).  She reports a bad reaction to eliquis and she stopped it. she had been being followed at a wound care clinic due to LE ulcers, said to be from chronic leg swelling. She reports a history of falls specifically over the past year resulting in injury including wrist and rip fractures. Falls were said to be mechanical, trip and falls. The recent fall was associated with bending over while putting bottle in recycle bin. She reports easily loosing balance. she denies falls since her last admission. Her DTR thuy reports they have made the house more suitable for handicap. She lives alone.  She reports a 20 year + 1 PPD smoking history. Denies other toxic habits. Denies FH with premature CAD.

## 2025-03-27 NOTE — DISCUSSION/SUMMARY
[EKG obtained to assist in diagnosis and management of assessed problem(s)] : EKG obtained to assist in diagnosis and management of assessed problem(s) [FreeTextEntry1] : She is an 85 year old female with a history of HTN, chronic diastolic heart failure, MR/TR, atrial fibrillation not on A/C, COPD on home O2 prn, who presents for post hospitalization follow up. She arrives today, in no acute distress. she appears euvolemic on exam. ECG illustrates rate controlled atrial fibrillation. Her B/P improved come by the conclusion of the visit, but not optimal.  Based on the cardiology progress notes, she was said to have a history of severe MR and TR. In light of her recently falls and complaints of LE edema associated with SOB, I have advised she undergo an echocardiogram to assess her cardiac structure and function particularly to assess valvular function for abnormalities. She will continue current diuretic regimen with lasix total 60mg daily and spironolactone 25mg daily. She will undergo repeat labs to assess electrolyte and renal function.  She is in rate controlled atrial fibrillation.  She had [id number] of 5, however given her chronic falls, risk for oral A/C may outweigh the benefits at this time.  We discussed the possibility of being assessed for LAAO device placement in the near future. For now, she will remain off oral A/C.  I will try to request records from her PCP, particularly cardiology consultation and prior cardiac testing.  Continued fall precautions and PT encouraged. I will call her DTR in law  with results of the echocardiogram and B/W she will follow up again with me in 3 months.

## 2025-03-27 NOTE — PHYSICAL EXAM
[Frail] : frail [Normal Conjunctiva] : normal conjunctiva [No Carotid Bruit] : no carotid bruit [Normal Rate] : normal [Irregularly Irregular] : irregularly irregular [___ +] : bilateral [unfilled]U+ pretibial pitting edema [Rt] : varicose veins of the right leg noted [Lt] : varicose veins of the left leg noted [No Abnormalities] : the abdominal aorta was not enlarged and no bruit was heard [Decreased Breath Sounds] : breath sounds were decreased diffusely [Soft] : abdomen soft [Edema ___] : edema [unfilled] [No Focal Deficits] : no focal deficits [Alert and Oriented] : alert and oriented [Right Carotid Bruit] : no bruit heard over the right carotid [Left Carotid Bruit] : no bruit heard over the left carotid [de-identified] : chronic leg wounds [de-identified] : unsteady gait,

## 2025-03-27 NOTE — ADDENDUM
[FreeTextEntry1] : Recent hospitalization for fall though doing well overall In known rate controlled atrial fibrillation Has not been on anticoagulation because of fall risk Continue current diuretics For repeat echocardiogram to evaluate MR and TR, and blood work.

## 2025-03-27 NOTE — DISCUSSION/SUMMARY
[EKG obtained to assist in diagnosis and management of assessed problem(s)] : EKG obtained to assist in diagnosis and management of assessed problem(s) [FreeTextEntry1] : She is an 85 year old female with a history of HTN, chronic diastolic heart failure, MR/TR, atrial fibrillation not on A/C, COPD on home O2 prn, who presents for post hospitalization follow up. She arrives today, in no acute distress. she appears euvolemic on exam. ECG illustrates rate controlled atrial fibrillation. Her B/P improved come by the conclusion of the visit, but not optimal.  Based on the cardiology progress notes, she was said to have a history of severe MR and TR. In light of her recently falls and complaints of LE edema associated with SOB, I have advised she undergo an echocardiogram to assess her cardiac structure and function particularly to assess valvular function for abnormalities. She will continue current diuretic regimen with lasix total 60mg daily and spironolactone 25mg daily. She will undergo repeat labs to assess electrolyte and renal function.  She is in rate controlled atrial fibrillation.  She had [id number] of 5, however given her chronic falls, risk for oral A/C may outweigh the benefits at this time.  We discussed the possibility of being assessed for LAAO device placement in the near future. For now, she will remain off oral A/C.  I will try to request records from her PCP, particularly cardiology consultation and prior cardiac testing.  Continued fall precautions and PT encouraged. I will call her DTR in law  with results of the echocardiogram and B/W she will follow up again with me in 3 months.

## 2025-03-31 LAB
ALBUMIN SERPL ELPH-MCNC: 4.5 G/DL
ALP BLD-CCNC: 127 U/L
ALT SERPL-CCNC: 29 U/L
ANION GAP SERPL CALC-SCNC: 15 MMOL/L
AST SERPL-CCNC: 38 U/L
BILIRUB SERPL-MCNC: 1 MG/DL
BUN SERPL-MCNC: 20 MG/DL
CALCIUM SERPL-MCNC: 9 MG/DL
CHLORIDE SERPL-SCNC: 89 MMOL/L
CO2 SERPL-SCNC: 32 MMOL/L
CREAT SERPL-MCNC: 0.81 MG/DL
EGFRCR SERPLBLD CKD-EPI 2021: 71 ML/MIN/1.73M2
GLUCOSE SERPL-MCNC: 87 MG/DL
NT-PROBNP SERPL-MCNC: 657 PG/ML
POTASSIUM SERPL-SCNC: 4.9 MMOL/L
PROT SERPL-MCNC: 7.1 G/DL
SODIUM SERPL-SCNC: 136 MMOL/L

## 2025-04-01 LAB
HCT VFR BLD CALC: 45.3 %
HGB BLD-MCNC: 14.8 G/DL
MCHC RBC-ENTMCNC: 32.7 G/DL
MCHC RBC-ENTMCNC: 33.9 PG
MCV RBC AUTO: 103.7 FL
PLATELET # BLD AUTO: 310 K/UL
RBC # BLD: 4.37 M/UL
RBC # FLD: 14.6 %
WBC # FLD AUTO: 6.53 K/UL

## 2025-04-04 ENCOUNTER — APPOINTMENT (OUTPATIENT)
Dept: CARDIOLOGY | Facility: CLINIC | Age: 85
End: 2025-04-04

## 2025-05-07 ENCOUNTER — EMERGENCY (EMERGENCY)
Facility: HOSPITAL | Age: 85
LOS: 1 days | End: 2025-05-07
Attending: STUDENT IN AN ORGANIZED HEALTH CARE EDUCATION/TRAINING PROGRAM | Admitting: STUDENT IN AN ORGANIZED HEALTH CARE EDUCATION/TRAINING PROGRAM
Payer: MEDICARE

## 2025-05-07 VITALS
TEMPERATURE: 97 F | RESPIRATION RATE: 29 BRPM | HEIGHT: 63 IN | DIASTOLIC BLOOD PRESSURE: 119 MMHG | OXYGEN SATURATION: 94 % | HEART RATE: 107 BPM | WEIGHT: 106.92 LBS | SYSTOLIC BLOOD PRESSURE: 173 MMHG

## 2025-05-07 VITALS
TEMPERATURE: 98 F | DIASTOLIC BLOOD PRESSURE: 86 MMHG | SYSTOLIC BLOOD PRESSURE: 161 MMHG | RESPIRATION RATE: 22 BRPM | OXYGEN SATURATION: 92 % | HEART RATE: 88 BPM

## 2025-05-07 PROCEDURE — 70486 CT MAXILLOFACIAL W/O DYE: CPT | Mod: 26

## 2025-05-07 PROCEDURE — 72125 CT NECK SPINE W/O DYE: CPT

## 2025-05-07 PROCEDURE — 12011 RPR F/E/E/N/L/M 2.5 CM/<: CPT

## 2025-05-07 PROCEDURE — 72125 CT NECK SPINE W/O DYE: CPT | Mod: 26

## 2025-05-07 PROCEDURE — 99284 EMERGENCY DEPT VISIT MOD MDM: CPT | Mod: 25

## 2025-05-07 PROCEDURE — 70450 CT HEAD/BRAIN W/O DYE: CPT

## 2025-05-07 PROCEDURE — 70450 CT HEAD/BRAIN W/O DYE: CPT | Mod: 26

## 2025-05-07 PROCEDURE — 70486 CT MAXILLOFACIAL W/O DYE: CPT

## 2025-05-07 RX ORDER — LIDOCAINE HCL/EPINEPHRINE/PF 1 %-1:200K
20 AMPUL (ML) INJECTION ONCE
Refills: 0 | Status: DISCONTINUED | OUTPATIENT
Start: 2025-05-07 | End: 2025-05-11

## 2025-05-07 RX ORDER — AMOXICILLIN AND CLAVULANATE POTASSIUM 500; 125 MG/1; MG/1
1 TABLET, FILM COATED ORAL
Qty: 20 | Refills: 0
Start: 2025-05-07 | End: 2025-05-16

## 2025-05-07 RX ORDER — BACITRACIN 500 UNIT/G
1 OINTMENT (GRAM) TOPICAL ONCE
Refills: 0 | Status: COMPLETED | OUTPATIENT
Start: 2025-05-07 | End: 2025-05-07

## 2025-05-07 RX ORDER — BACITRACIN 500 UNIT/G
1 OINTMENT (GRAM) TOPICAL ONCE
Refills: 0 | Status: DISCONTINUED | OUTPATIENT
Start: 2025-05-07 | End: 2025-05-11

## 2025-05-07 RX ORDER — AMOXICILLIN AND CLAVULANATE POTASSIUM 500; 125 MG/1; MG/1
1 TABLET, FILM COATED ORAL ONCE
Refills: 0 | Status: COMPLETED | OUTPATIENT
Start: 2025-05-07 | End: 2025-05-07

## 2025-05-07 RX ADMIN — AMOXICILLIN AND CLAVULANATE POTASSIUM 1 TABLET(S): 500; 125 TABLET, FILM COATED ORAL at 17:28

## 2025-05-07 RX ADMIN — Medication 1 APPLICATION(S): at 16:31

## 2025-05-08 NOTE — ED ADULT TRIAGE NOTE - INTERNATIONAL TRAVEL
Health Maintenance       COVID-19 Vaccine (5 - 2024-25 season)  Overdue since 9/1/2024    Hepatitis B Vaccine (1 of 3 - 19+ 3-dose series)  Never done    Pneumococcal Vaccine 50+ (1 of 1 - PCV)  Never done    Depression Screening (Yearly)  Due soon on 7/20/2025           Following review of the above:  Patient wishes to discuss with clinician: Pneumococcal    Note: Refer to final orders and clinician documentation.         No

## 2025-05-16 ENCOUNTER — NON-APPOINTMENT (OUTPATIENT)
Age: 85
End: 2025-05-16

## 2025-05-22 NOTE — DISCHARGE NOTE PROVIDER - ATTENDING ATTESTATION STATEMENT
----- Message from Liliana sent at 5/22/2025 11:17 AM CDT -----  Contact: Patient  Type:  Needs Medical AdviceWho Called:   PatientWould the patient rather a call back or a response via [facility]?  Call Sheri Call Back Number:     [phone] Information:   States she would like to speak with someone about her blood pressure - states she was advised by her cardiologist not to take blood pressure medication because her blood pressure readings are perfect  - please call - thank you  
LVM for pt to call back to clinic with BP readings.  
Please call for BP readings with adding amlodipine 5 mg daily and continuing metoprolol 12.5 mg daily.     She does not use mychart.     Thanks   
Returned call, pt states at last visit, she was given a new bp med.  States she is going to have a stress test soon.  States she had to call cardiologist for any changes in her medications.  She called the cardiologist office to advise and they told her not to take the new med as it will drop her too low.        104/67 p 60  108/64 p60  99/56 p 60  100/58 p 60  122/64 p 60 - states this one was not rested    States she has home health and will have the nurse check her machine.  Pt states the bus was late picking her up, riding on the bus, traffic, states it kinds of freaks her out now.  States she was really anxious because she does not like to be late.  States she took it after she returned home it was 136/69.  Pt will be holding the amlodipine, per pt, the cardiologist would like to monitor her blood pressure.  He would like to be notified for any abnormal  
I have personally seen and examined the patient. I have collaborated with and supervised the

## 2025-06-12 ENCOUNTER — APPOINTMENT (OUTPATIENT)
Dept: OTOLARYNGOLOGY | Facility: CLINIC | Age: 85
End: 2025-06-12

## 2025-07-03 ENCOUNTER — NON-APPOINTMENT (OUTPATIENT)
Age: 85
End: 2025-07-03

## 2025-07-03 ENCOUNTER — APPOINTMENT (OUTPATIENT)
Dept: CARDIOLOGY | Facility: CLINIC | Age: 85
End: 2025-07-03
Payer: MEDICARE

## 2025-07-03 VITALS
DIASTOLIC BLOOD PRESSURE: 96 MMHG | HEIGHT: 63 IN | OXYGEN SATURATION: 94 % | SYSTOLIC BLOOD PRESSURE: 152 MMHG | HEART RATE: 101 BPM

## 2025-07-03 VITALS — DIASTOLIC BLOOD PRESSURE: 82 MMHG | SYSTOLIC BLOOD PRESSURE: 132 MMHG

## 2025-07-03 PROCEDURE — 99214 OFFICE O/P EST MOD 30 MIN: CPT

## 2025-07-03 PROCEDURE — 93000 ELECTROCARDIOGRAM COMPLETE: CPT

## 2025-07-03 RX ORDER — FUROSEMIDE 20 MG/1
20 TABLET ORAL
Qty: 90 | Refills: 3 | Status: ACTIVE | COMMUNITY
Start: 2025-07-03 | End: 1900-01-01

## 2025-07-03 NOTE — PHYSICAL EXAM
[Frail] : frail [Normal Conjunctiva] : normal conjunctiva [No Carotid Bruit] : no carotid bruit [Normal Rate] : normal [Irregularly Irregular] : irregularly irregular [___ +] : bilateral [unfilled]U+ pretibial pitting edema [Rt] : varicose veins of the right leg noted [Lt] : varicose veins of the left leg noted [No Abnormalities] : the abdominal aorta was not enlarged and no bruit was heard [Decreased Breath Sounds] : breath sounds were decreased diffusely [Soft] : abdomen soft [Edema ___] : edema [unfilled] [No Focal Deficits] : no focal deficits [Alert and Oriented] : alert and oriented [Right Carotid Bruit] : no bruit heard over the right carotid [Left Carotid Bruit] : no bruit heard over the left carotid [de-identified] : unsteady gait, [de-identified] : chronic leg wounds

## 2025-07-03 NOTE — DISCUSSION/SUMMARY
[FreeTextEntry1] : She is an 85 year old female with a history of HTN, chronic diastolic heart failure, MR/TR, atrial fibrillation not on A/C, COPD on home O2 prn, who presents for follow-up.  Overall, she is doing okay.  She appears euvolemic on exam without edema.  She remains in generally controlled atrial fibrillation.  Her blood pressure improved by the end of the visit.  She will continue current diuretic regimen with lasix total 60mg daily and spironolactone 25mg daily. Her renal function is normal.  Echocardiogram with normal LV function, severe TR and moderate to severely elevated pulmonary pressures. She would prefer for conservative measurement for now.   She is in rate controlled atrial fibrillation.  She had [id number] of 5, however given her chronic falls, risk for oral A/C may outweigh the benefits at this time.  We discussed the possibility of being assessed for LAAO device placement in the near future. For now, she will remain off oral A/C.  Continued fall precautions and PT encouraged. she will follow up again with me in 4 months. [EKG obtained to assist in diagnosis and management of assessed problem(s)] : EKG obtained to assist in diagnosis and management of assessed problem(s)

## 2025-07-03 NOTE — HISTORY OF PRESENT ILLNESS
[FreeTextEntry1] : Ms Zapata arrives for follow up.  She is accompanied by her DTR in law [phone] She is an 85 year old female with a history of HTN, chronic diastolic heart failure, MR/TR, atrial fibrillation not on A/C, COPD who presented to [facility] hospital with a falls and unsteady gait.  She was initially seen in [facility] ED on 2/4 s/p mechanical fall, all imaging was negative for acute injuries and had physical therapy evaluation that recommended subacute rehab however patient and family elected to be discharged home.  She then returned to [facility] on 2/14/25 stating she is having difficulty ambulating at home despite cane and walker and wanted to go to [facility] but then again changed her mind.  She was seen by cardiology during her admission (Dr An) who reviewed outpatient notes, she was not on anticoagulation for atrial fibrillation due to significant bleeding from a leg wound and for falls. Was also said to have severe MR and severe TR and pHTN. B/w demonstrated hypokalemia, probnp of 1083. She was discharged on lasix 60mg and spironolactone 25mg  She was started on oxygen during her initial admission due to desaturation on ambulation. Said to have COPD.  She reports being seen by a cardiologist in the past , likely over one year ago, was said she had atrial fibrillation and was prescribed oral A/C (eliquis).  She reports a bad reaction to eliquis and she stopped it. she had been being followed at a wound care clinic due to LE ulcers, said to be from chronic leg swelling. She reports a history of falls specifically over the past year resulting in injury including wrist and rip fractures. Falls were said to be mechanical, trip and falls. The recent fall was associated with bending over while putting bottle in recycle bin. She reports easily losing balance.  I last saw her in 3/2025.  Her EF was normal at 58%, though she has severe TR and moderate to severe pulmonary hypertension.  Blood work noted a creatinine of 0.81 her BNP was 657, which was lower than her hospitalization. Overall, she is doing okay.  She did have a mechanical fall in May 2025, and broke her left orbital bone.  Her breathing is ok, though generally unchanged. She has no edema.